# Patient Record
Sex: FEMALE | Race: WHITE | Employment: OTHER | URBAN - METROPOLITAN AREA
[De-identification: names, ages, dates, MRNs, and addresses within clinical notes are randomized per-mention and may not be internally consistent; named-entity substitution may affect disease eponyms.]

---

## 2017-01-04 ENCOUNTER — HOSPITAL ENCOUNTER (OUTPATIENT)
Dept: ULTRASOUND IMAGING | Facility: CLINIC | Age: 77
Discharge: HOME | End: 2017-01-04
Attending: PHYSICIAN ASSISTANT | Admitting: PHYSICIAN ASSISTANT
Payer: MEDICARE

## 2017-01-04 DIAGNOSIS — I85.00 ESOPHAGEAL VARICES WITHOUT BLEEDING, UNSPECIFIED ESOPHAGEAL VARICES TYPE (H): ICD-10-CM

## 2017-01-04 PROCEDURE — 93975 VASCULAR STUDY: CPT | Mod: TC

## 2017-06-12 ENCOUNTER — HOSPITAL ENCOUNTER (INPATIENT)
Facility: CLINIC | Age: 77
LOS: 1 days | Discharge: HOME | DRG: 369 | End: 2017-06-13
Attending: EMERGENCY MEDICINE | Admitting: INTERNAL MEDICINE
Payer: MEDICARE

## 2017-06-12 DIAGNOSIS — K92.0 HEMATEMESIS, PRESENCE OF NAUSEA NOT SPECIFIED: ICD-10-CM

## 2017-06-12 DIAGNOSIS — D64.9 ANEMIA, UNSPECIFIED TYPE: ICD-10-CM

## 2017-06-12 LAB
ABO + RH BLD: ABNORMAL
ABO + RH BLD: ABNORMAL
ALBUMIN SERPL-MCNC: 3.3 G/DL (ref 3.4–5)
ALP SERPL-CCNC: 68 U/L (ref 40–150)
ALT SERPL W P-5'-P-CCNC: 24 U/L (ref 0–50)
ANION GAP SERPL CALCULATED.3IONS-SCNC: 9 MMOL/L (ref 3–14)
APTT PPP: 31 SEC (ref 22–37)
AST SERPL W P-5'-P-CCNC: 22 U/L (ref 0–45)
BASOPHILS # BLD AUTO: 0 10E9/L (ref 0–0.2)
BASOPHILS NFR BLD AUTO: 0.2 %
BILIRUB SERPL-MCNC: 0.6 MG/DL (ref 0.2–1.3)
BLD GP AB INVEST PLASRBC-IMP: ABNORMAL
BLD GP AB SCN SERPL QL: ABNORMAL
BLOOD BANK CMNT PATIENT-IMP: ABNORMAL
BUN SERPL-MCNC: 27 MG/DL (ref 7–30)
CALCIUM SERPL-MCNC: 8.4 MG/DL (ref 8.5–10.1)
CHLORIDE SERPL-SCNC: 113 MMOL/L (ref 94–109)
CO2 SERPL-SCNC: 22 MMOL/L (ref 20–32)
CREAT SERPL-MCNC: 0.7 MG/DL (ref 0.52–1.04)
DIFFERENTIAL METHOD BLD: ABNORMAL
EOSINOPHIL # BLD AUTO: 0 10E9/L (ref 0–0.7)
EOSINOPHIL NFR BLD AUTO: 0.4 %
ERYTHROCYTE [DISTWIDTH] IN BLOOD BY AUTOMATED COUNT: 14 % (ref 10–15)
GFR SERPL CREATININE-BSD FRML MDRD: 81 ML/MIN/1.7M2
GLUCOSE BLDC GLUCOMTR-MCNC: 107 MG/DL (ref 70–99)
GLUCOSE SERPL-MCNC: 138 MG/DL (ref 70–99)
HCT VFR BLD AUTO: 33.3 % (ref 35–47)
HGB BLD-MCNC: 10.8 G/DL (ref 11.7–15.7)
HGB BLD-MCNC: 8.5 G/DL (ref 11.7–15.7)
HGB BLD-MCNC: 9 G/DL (ref 11.7–15.7)
IMM GRANULOCYTES # BLD: 0 10E9/L (ref 0–0.4)
IMM GRANULOCYTES NFR BLD: 0.2 %
INR PPP: 1.23 (ref 0.86–1.14)
LIPASE SERPL-CCNC: 180 U/L (ref 73–393)
LYMPHOCYTES # BLD AUTO: 0.9 10E9/L (ref 0.8–5.3)
LYMPHOCYTES NFR BLD AUTO: 15.8 %
MCH RBC QN AUTO: 30.8 PG (ref 26.5–33)
MCHC RBC AUTO-ENTMCNC: 32.4 G/DL (ref 31.5–36.5)
MCV RBC AUTO: 95 FL (ref 78–100)
MONOCYTES # BLD AUTO: 0.3 10E9/L (ref 0–1.3)
MONOCYTES NFR BLD AUTO: 5.5 %
NEUTROPHILS # BLD AUTO: 4.4 10E9/L (ref 1.6–8.3)
NEUTROPHILS NFR BLD AUTO: 77.9 %
PLATELET # BLD AUTO: 100 10E9/L (ref 150–450)
POTASSIUM SERPL-SCNC: 4.2 MMOL/L (ref 3.4–5.3)
PROT SERPL-MCNC: 6.1 G/DL (ref 6.8–8.8)
RBC # BLD AUTO: 3.51 10E12/L (ref 3.8–5.2)
SODIUM SERPL-SCNC: 144 MMOL/L (ref 133–144)
SPECIMEN EXP DATE BLD: ABNORMAL
WBC # BLD AUTO: 5.6 10E9/L (ref 4–11)

## 2017-06-12 PROCEDURE — 86870 RBC ANTIBODY IDENTIFICATION: CPT | Performed by: EMERGENCY MEDICINE

## 2017-06-12 PROCEDURE — 86901 BLOOD TYPING SEROLOGIC RH(D): CPT | Performed by: EMERGENCY MEDICINE

## 2017-06-12 PROCEDURE — 85025 COMPLETE CBC W/AUTO DIFF WBC: CPT | Performed by: EMERGENCY MEDICINE

## 2017-06-12 PROCEDURE — 96374 THER/PROPH/DIAG INJ IV PUSH: CPT

## 2017-06-12 PROCEDURE — 80053 COMPREHEN METABOLIC PANEL: CPT | Performed by: EMERGENCY MEDICINE

## 2017-06-12 PROCEDURE — 83690 ASSAY OF LIPASE: CPT | Performed by: EMERGENCY MEDICINE

## 2017-06-12 PROCEDURE — 25000125 ZZHC RX 250: Performed by: INTERNAL MEDICINE

## 2017-06-12 PROCEDURE — 99285 EMERGENCY DEPT VISIT HI MDM: CPT

## 2017-06-12 PROCEDURE — 25000128 H RX IP 250 OP 636: Performed by: INTERNAL MEDICINE

## 2017-06-12 PROCEDURE — 96361 HYDRATE IV INFUSION ADD-ON: CPT

## 2017-06-12 PROCEDURE — 40000141 ZZH STATISTIC PERIPHERAL IV START W/O US GUIDANCE

## 2017-06-12 PROCEDURE — 85018 HEMOGLOBIN: CPT | Performed by: INTERNAL MEDICINE

## 2017-06-12 PROCEDURE — 99223 1ST HOSP IP/OBS HIGH 75: CPT | Mod: AI | Performed by: INTERNAL MEDICINE

## 2017-06-12 PROCEDURE — 96375 TX/PRO/DX INJ NEW DRUG ADDON: CPT

## 2017-06-12 PROCEDURE — 93005 ELECTROCARDIOGRAM TRACING: CPT

## 2017-06-12 PROCEDURE — 12000000 ZZH R&B MED SURG/OB

## 2017-06-12 PROCEDURE — 93010 ELECTROCARDIOGRAM REPORT: CPT | Performed by: INTERNAL MEDICINE

## 2017-06-12 PROCEDURE — 25000128 H RX IP 250 OP 636: Performed by: HOSPITALIST

## 2017-06-12 PROCEDURE — 25000131 ZZH RX MED GY IP 250 OP 636 PS 637: Mod: GY | Performed by: EMERGENCY MEDICINE

## 2017-06-12 PROCEDURE — 43235 EGD DIAGNOSTIC BRUSH WASH: CPT | Performed by: INTERNAL MEDICINE

## 2017-06-12 PROCEDURE — 86900 BLOOD TYPING SEROLOGIC ABO: CPT | Performed by: EMERGENCY MEDICINE

## 2017-06-12 PROCEDURE — 25000125 ZZHC RX 250

## 2017-06-12 PROCEDURE — 0DJ08ZZ INSPECTION OF UPPER INTESTINAL TRACT, VIA NATURAL OR ARTIFICIAL OPENING ENDOSCOPIC: ICD-10-PCS | Performed by: INTERNAL MEDICINE

## 2017-06-12 PROCEDURE — 85610 PROTHROMBIN TIME: CPT | Performed by: EMERGENCY MEDICINE

## 2017-06-12 PROCEDURE — 86850 RBC ANTIBODY SCREEN: CPT | Performed by: EMERGENCY MEDICINE

## 2017-06-12 PROCEDURE — 00000146 ZZHCL STATISTIC GLUCOSE BY METER IP

## 2017-06-12 PROCEDURE — 36415 COLL VENOUS BLD VENIPUNCTURE: CPT | Performed by: INTERNAL MEDICINE

## 2017-06-12 PROCEDURE — 25000128 H RX IP 250 OP 636: Performed by: EMERGENCY MEDICINE

## 2017-06-12 PROCEDURE — 25000125 ZZHC RX 250: Performed by: EMERGENCY MEDICINE

## 2017-06-12 PROCEDURE — S0164 INJECTION PANTROPRAZOLE: HCPCS | Performed by: INTERNAL MEDICINE

## 2017-06-12 PROCEDURE — S0164 INJECTION PANTROPRAZOLE: HCPCS | Performed by: EMERGENCY MEDICINE

## 2017-06-12 PROCEDURE — 25000128 H RX IP 250 OP 636

## 2017-06-12 PROCEDURE — 85730 THROMBOPLASTIN TIME PARTIAL: CPT | Performed by: EMERGENCY MEDICINE

## 2017-06-12 RX ORDER — NALOXONE HYDROCHLORIDE 0.4 MG/ML
.1-.4 INJECTION, SOLUTION INTRAMUSCULAR; INTRAVENOUS; SUBCUTANEOUS
Status: DISCONTINUED | OUTPATIENT
Start: 2017-06-12 | End: 2017-06-12

## 2017-06-12 RX ORDER — LIDOCAINE 40 MG/G
CREAM TOPICAL
Status: CANCELLED | OUTPATIENT
Start: 2017-06-12

## 2017-06-12 RX ORDER — OCTREOTIDE ACETATE 50 UG/ML
50 INJECTION, SOLUTION INTRAVENOUS; SUBCUTANEOUS ONCE
Status: COMPLETED | OUTPATIENT
Start: 2017-06-12 | End: 2017-06-12

## 2017-06-12 RX ORDER — AMLODIPINE BESYLATE 2.5 MG/1
5 TABLET ORAL DAILY
Status: DISCONTINUED | OUTPATIENT
Start: 2017-06-13 | End: 2017-06-13

## 2017-06-12 RX ORDER — FENTANYL CITRATE 50 UG/ML
100 INJECTION, SOLUTION INTRAMUSCULAR; INTRAVENOUS ONCE
Status: DISCONTINUED | OUTPATIENT
Start: 2017-06-12 | End: 2017-06-12

## 2017-06-12 RX ORDER — SODIUM CHLORIDE 9 MG/ML
INJECTION, SOLUTION INTRAVENOUS CONTINUOUS
Status: DISCONTINUED | OUTPATIENT
Start: 2017-06-12 | End: 2017-06-12 | Stop reason: DRUGHIGH

## 2017-06-12 RX ORDER — LIDOCAINE 40 MG/G
CREAM TOPICAL
Status: DISCONTINUED | OUTPATIENT
Start: 2017-06-12 | End: 2017-06-13 | Stop reason: HOSPADM

## 2017-06-12 RX ORDER — FLUMAZENIL 0.1 MG/ML
0.2 INJECTION, SOLUTION INTRAVENOUS
Status: DISCONTINUED | OUTPATIENT
Start: 2017-06-12 | End: 2017-06-12

## 2017-06-12 RX ORDER — FENTANYL CITRATE 50 UG/ML
25 INJECTION, SOLUTION INTRAMUSCULAR; INTRAVENOUS EVERY 5 MIN PRN
Status: DISCONTINUED | OUTPATIENT
Start: 2017-06-12 | End: 2017-06-12

## 2017-06-12 RX ORDER — FENTANYL CITRATE 50 UG/ML
25-50 INJECTION, SOLUTION INTRAMUSCULAR; INTRAVENOUS
Status: DISCONTINUED | OUTPATIENT
Start: 2017-06-12 | End: 2017-06-12

## 2017-06-12 RX ORDER — CEFTRIAXONE 1 G/1
1 INJECTION, POWDER, FOR SOLUTION INTRAMUSCULAR; INTRAVENOUS EVERY 24 HOURS
Status: DISCONTINUED | OUTPATIENT
Start: 2017-06-12 | End: 2017-06-13

## 2017-06-12 RX ORDER — NALOXONE HYDROCHLORIDE 0.4 MG/ML
.1-.4 INJECTION, SOLUTION INTRAMUSCULAR; INTRAVENOUS; SUBCUTANEOUS
Status: DISCONTINUED | OUTPATIENT
Start: 2017-06-12 | End: 2017-06-13 | Stop reason: HOSPADM

## 2017-06-12 RX ORDER — SODIUM CHLORIDE 9 MG/ML
1000 INJECTION, SOLUTION INTRAVENOUS CONTINUOUS
Status: DISCONTINUED | OUTPATIENT
Start: 2017-06-12 | End: 2017-06-13

## 2017-06-12 RX ORDER — FENTANYL CITRATE 50 UG/ML
INJECTION, SOLUTION INTRAMUSCULAR; INTRAVENOUS
Status: COMPLETED
Start: 2017-06-12 | End: 2017-06-12

## 2017-06-12 RX ORDER — ONDANSETRON 4 MG/1
4 TABLET, ORALLY DISINTEGRATING ORAL EVERY 6 HOURS PRN
Status: DISCONTINUED | OUTPATIENT
Start: 2017-06-12 | End: 2017-06-13 | Stop reason: HOSPADM

## 2017-06-12 RX ORDER — LISINOPRIL 2.5 MG/1
2.5 TABLET ORAL DAILY
Status: DISCONTINUED | OUTPATIENT
Start: 2017-06-13 | End: 2017-06-13 | Stop reason: HOSPADM

## 2017-06-12 RX ORDER — ONDANSETRON 2 MG/ML
4 INJECTION INTRAMUSCULAR; INTRAVENOUS EVERY 6 HOURS PRN
Status: DISCONTINUED | OUTPATIENT
Start: 2017-06-12 | End: 2017-06-13 | Stop reason: HOSPADM

## 2017-06-12 RX ADMIN — FENTANYL CITRATE 100 MCG: 50 INJECTION, SOLUTION INTRAMUSCULAR; INTRAVENOUS at 13:10

## 2017-06-12 RX ADMIN — SODIUM CHLORIDE 1000 ML: 9 INJECTION, SOLUTION INTRAVENOUS at 07:25

## 2017-06-12 RX ADMIN — SODIUM CHLORIDE 1000 ML: 9 INJECTION, SOLUTION INTRAVENOUS at 23:25

## 2017-06-12 RX ADMIN — SODIUM CHLORIDE 1000 ML: 9 INJECTION, SOLUTION INTRAVENOUS at 05:00

## 2017-06-12 RX ADMIN — PANTOPRAZOLE SODIUM 80 MG: 40 INJECTION, POWDER, FOR SOLUTION INTRAVENOUS at 05:25

## 2017-06-12 RX ADMIN — PANTOPRAZOLE SODIUM 8 MG/HR: 40 INJECTION, POWDER, FOR SOLUTION INTRAVENOUS at 05:24

## 2017-06-12 RX ADMIN — OCTREOTIDE ACETATE 50 MCG/HR: 200 INJECTION, SOLUTION INTRAVENOUS; SUBCUTANEOUS at 05:25

## 2017-06-12 RX ADMIN — MIDAZOLAM HYDROCHLORIDE 2 MG: 1 INJECTION, SOLUTION INTRAMUSCULAR; INTRAVENOUS at 13:08

## 2017-06-12 RX ADMIN — OCTREOTIDE ACETATE 50 MCG: 50 INJECTION, SOLUTION INTRAVENOUS; SUBCUTANEOUS at 05:24

## 2017-06-12 RX ADMIN — PANTOPRAZOLE SODIUM 8 MG/HR: 40 INJECTION, POWDER, FOR SOLUTION INTRAVENOUS at 17:59

## 2017-06-12 RX ADMIN — CEFTRIAXONE 1 G: 1 INJECTION, POWDER, FOR SOLUTION INTRAMUSCULAR; INTRAVENOUS at 10:32

## 2017-06-12 ASSESSMENT — ENCOUNTER SYMPTOMS
VOMITING: 1
NAUSEA: 0
ABDOMINAL PAIN: 0
ROS GI COMMENTS: BLACK STOOLS
FEVER: 0

## 2017-06-12 NOTE — ED NOTES
[facility]  ED Nurse Handoff Report    ED Chief complaint: Hematemesis (x2; had similar episode last October)      ED Diagnosis:   Final diagnoses:   Hematemesis, presence of nausea not specified   Anemia, unspecified type       Code Status: Full Code    Allergies:   Allergies   Allergen Reactions     Codeine        Activity level - Baseline/Home:  Independent    Activity Level - Current:   Independent     Needed?: No    Isolation: No  Infection: Not Applicable    Bariatric?: No    Vital Signs:   Vitals:    06/12/17 0530 06/12/17 0545 06/12/17 0600 06/12/17 0605   BP: 124/55 113/59 91/49 124/68   Pulse:       Resp: 20 16 16 10   Temp:       TempSrc:       SpO2: 97% 98% 94% 98%   Weight:       Height:           Cardiac Rhythm: ,   Cardiac  Cardiac Rhythm: Normal sinus rhythm    Pain level: 0-10 Pain Scale: 0    Is this patient confused?: No    Patient Report: Initial Complaint: The Pt presents to the ED with c/o hematemesis x 2 over the last 24 hours. The Pt reports that she is pain free and is not nauseated. She also indicates that she has had previous hematemesis and has required an upper endoscopy. She states that she has been using iron pills and her stool is dark. She reports this may be from iron supplements.  Focused Assessment: The Pt has done well in the ED. She denies pain.  Tests Performed: lab work  Abnormal Results: see flowsheet  Treatments provided: medications and fluids    Family Comments:  at bedside.    OBS brochure/video discussed/provided to patient: N/A    ED Medications:   Medications   lidocaine 1 % 1 mL (not administered)   lidocaine (LMX4) cream (not administered)   sodium chloride (PF) 0.9% PF flush 3 mL (not administered)   sodium chloride (PF) 0.9% PF flush 3 mL (not administered)   0.9% sodium chloride BOLUS (0 mLs Intravenous Stopped 6/12/17 0638)     Followed by   0.9% sodium chloride infusion (not administered)   pantoprazole (PROTONIX) 80 mg in NaCl  0.9 % 100 mL infusion (8 mg/hr Intravenous New Bag 6/12/17 0524)   octreotide (sandoSTATIN) 1,250 mcg in NaCl 0.9 % 250 mL (50 mcg/hr Intravenous New Bag 6/12/17 0525)   pantoprazole (PROTONIX) 80 mg in NaCl 0.9 % 100 mL intermittent infusion (80 mg Intravenous New Bag 6/12/17 0525)   octreotide (sandoSTATIN) injection 50 mcg (50 mcg Intravenous Given 6/12/17 0524)       Drips infusing?:  Yes      ED NURSE PHONE NUMBER: [phone]

## 2017-06-12 NOTE — ED PROVIDER NOTES
"  History     Chief Complaint:  Hematemesis     HPI   Catina Barrow is a 76 year old female with history of esophageal varices who presents to the emergency department today for evaluation of hematemesis. The patient reports hematemesis twice yesterday, once in the morning and once at night. She had a similar episode in October 2016 secondary to esophageal varices. She reports black stool, however has been taking more iron supplements since she is anemic. The patient is afebrile and denies nausea and other concerns at this time.     Allergies:  Codeine     Medications:    pantoprazole (PROTONIX) 40 MG enteric coated tablet  ferrous sulfate (IRON) 325 (65 FE) MG tablet  ascorbic acid (VITAMIN C) 500 MG tablet  SERTRALINE HCL PO    Past Medical History:    Esophageal varices   Anemia, unspecified   Diastolic dysfunction   Hemorrhagic stroke    Hypertension   Recurrent major depression in remission     Past Surgical History:    Cholecystectomy  Hysterectomy   Keratomy arcuate with femtosecond laser/imaging for atiol x2  Phacoemulsification clear cornea with standard intraocular lens implant x2    Family History:    Hypertension    Social History:  The patient was accompanied to the ED by her .  Smoking Status: Never smoker   Alcohol Use: No   Marital Status:   [2]     Review of Systems   Constitutional: Negative for fever.   Gastrointestinal: Positive for vomiting (hematemesis). Negative for abdominal pain and nausea.        Black stools   All other systems reviewed and are negative.    Physical Exam   First Vitals:  BP: 102/55  Pulse: 86  Temp: 97.9  F (36.6  C)  Resp: 14  Height: 170.2 cm (5' 7\")  Weight: 61.2 kg (135 lb)  SpO2: 92 %      Physical Exam   Constitutional:  Appears well-developed and well-nourished. Cooperative.   Head:    Dried red blood in hair and on cheek   Mouth/Throat:   Oropharynx is without erythema or exudate. Dry oral mucosa.  Eyes:    Conjunctivae normal and EOM are normal. "      Pupils are equal, round, and reactive to light.   Neck:    Normal range of motion. Neck supple.   Cardiovascular:  Normal rate, regular rhythm, normal heart sounds and radial and dorsalis pedis pulses are 2+ and symmetric.    Pulmonary/Chest:  Effort normal and breath sounds normal.   Abdominal:   Soft. Bowel sounds are normal.      No splenomegaly or hepatomegaly. No tenderness. No rebound.   Musculoskeletal:  Normal range of motion. No tenderness. Trace edema in both legs.   Neurological:  Alert. Normal strength. No cranial nerve deficit.   Skin:    Skin is pale, warm, and dry.   Psychiatric:   Normal mood and affect.     Emergency Department Course     Laboratory:  Laboratory findings were communicated with the patient and family who voiced understanding of the findings.  CBC: HGB 10.8 (L) ,  (L)  o/w WNL. (WBC 5.6)   CMP:  Chloride 113 (H), Glucose 138 (H), Calcium 8.4 (L), Albumin 3.3 (L), Protein total 6.1 (L), o/w WNL (Creatinine 0.70)  INR: 1.23 (H)  Partial Thromboplastin time: 31  Lipase: 180    ABO/Rh Type and Screen: Pending     Interventions:  0500 NS 1,000mL IV  0524 Protonix 8mg/hr IV  0524 Sandostatin 50mcg IV   0525 Sandostatin 50mcg/hr IV new bag   0525 Protonix 8mg/hr IV new bag      Emergency Department Course:  Nursing notes and vitals reviewed.  I performed an exam of the patient as documented above.   IV was inserted and blood was drawn for laboratory testing, results above.  0605: I spoke with Dr. Monahan of the hospitalist service regarding patient's presentation, findings, and plan of care.  0620 I spoke with Dr. Mario of the gastroenterology service regarding patient's presentation, findings, and plan of care.   I discussed the treatment plan with the patient. They expressed understanding of this plan and consented to admission. I discussed the patient with Dr. Monahan, who will admit the patient to a monitored bed for further evaluation and treatment.  I personally reviewed the  laboratory results with the Patient and answered all related questions prior to admission.     Impression & Plan      Medical Decision Making:  Catina Barrow is a 76 year old female who presents with hematemesis.  This is consistent with an upper gastrointestinal bleed likely secondary to esophageal varices. Differential considered includes ulcer, gastritis, avm, tumor, esophagitis, variceal bleed, miriam-jovel tear, ingestion, etc.  Patient was initially hypotensive at 102/55, however that improved to 124/68 throughout her course in the ED. No history of chronic liver disease and INR is normal. Protonix given. I started the patient on octreotide as her presentation is similar to her previous episodes of hematemesis due to varices. Given amount of bleeding and presentation, I would hospitalize patient at this time. GI was consulted.  Admit to ICU under care of the hospitalist for further cares.  Type and cross completed. Blood was not transfused in the ED.  Watched closely in ED for further drop in BP or HR elevation.      Diagnosis:    ICD-10-CM    1. Hematemesis, presence of nausea not specified K92.0    2. Anemia, unspecified type D64.9      Disposition:   Admitted to ICU under the supervision of Dr. Monahan.     Scribe Disclosure:  Lorenza CARTER, am serving as a scribe at 4:43 AM on 6/12/2017 to document services personally performed by Pola Pompa MD, based on my observations and the provider's statements to me.    6/12/2017    EMERGENCY DEPARTMENT       Pola Pompa MD  06/12/17 0825

## 2017-06-12 NOTE — IP AVS SNAPSHOT
[facility]    [address] [place] [address]    Phone:  [phone]                                       After Visit Summary   6/12/2017    Catina Barrow    MRN: [id number]           After Visit Summary Signature Page     I have received my discharge instructions, and my questions have been answered. I have discussed any challenges I see with this plan with the nurse or doctor.    ..........................................................................................................................................  Patient/Patient Representative Signature      ..........................................................................................................................................  Patient Representative Print Name and Relationship to Patient    ..................................................               ................................................  Date                                            Time    ..........................................................................................................................................  Reviewed by Signature/Title    ...................................................              ..............................................  Date                                                            Time

## 2017-06-12 NOTE — DOWNTIME EVENT NOTE
The EMR was down for 5 hours on 6/12/2017.  RN was responsible for completing the paper charting during this time period.     The following information was re-entered into the system by Gabe Bright: Flowsheet data, Intake and output, Orders and MAR    The following information will remain in the paper chart: Assessment Data    Gabe Bright  6/12/2017

## 2017-06-12 NOTE — H&P
DATE OF ADMISSION:  06/12/2017      PRIMARY CARE PHYSICIAN:  Dr. Liang Glover.      PRIMARY GASTROENTEROLOGIST:  Dr. Kelly Traylor.      CHIEF COMPLAINT:  Hematemesis x3.      HISTORY OF PRESENT ILLNESS:  Catina Barrow is a 76-year-old  male with history of known esophageal varices.  Unfortunately, the patient had some problems with her dentures and she had some denture pain.  She ended up taking 2 Advil daily for about 4 days, ending on Saturday.  The patient last night had 1 episode of hematemesis in the evening and then around 3:00 a.m. she had her second episode of hematemesis.  She came to [facility] for further assessment.      In the emergency, the patient seen by Dr. Pola Pompa.  The patient's blood pressures were on the softer side, initially at 91/49; however, she was not tachycardic.  She was afebrile.  Blood work revealed normal electrolytes, normal kidney function, BUN was 27, creatinine 0.70 with calculated GFR 81.  LFTs significant for albumin of 3.3.  She does have cirrhosis.  Total protein 6.1.  Otherwise other LFTs were normal.  Lipase is normal.  CBC showed a white count 5.3, hemoglobin 10.8, platelets of 100.  INR is 1.23.  The patient was started on octreotide drip and Protonix drip.  She is being admitted to the Intensive Care Unit for suspected esophageal variceal bleed.      The patient was examined by me in the Emergency Department.  She is currently hemodynamically stable.  She is not having any chest pain or shortness of breath.  She is not feeling orthostatic.  Blood pressures have improved above 100.  She continues not to be tachycardic.      PAST MEDICAL HISTORY:   1.  Esophageal varices.   2.  Diastolic dysfunction.   3.  History of hemorrhagic stroke.   4.  Hypertension.   5.  Major recurrent depression in remission.   6.  Cirrhosis.   7.  Iron deficiency anemia.      PAST SURGICAL HISTORY:     1.  Cholecystectomy.   2.  Hysterectomy.   3.   Phacoemulsification and lens implantation.      FAMILY HISTORY:  Hypertension.      SOCIAL HISTORY:  No tobacco, occasional wine.  Denies any IV drug use.      ALLERGIES:  Codeine.      CURRENT MEDICATIONS:   1.  Vitamin C 500 mg once daily.   2.  Iron 325 mg twice a day.   3.  Protonix 40 mg once a day.   4.  Systane ultra ophthalmic solution 1 drop both eyes every hour as needed.     5.  Sertraline 100 mg once a day.      REVIEW OF SYSTEMS:  Ten point is reviewed and as dictated in history of present illness.      PHYSICAL EXAMINATION:   VITAL SIGNS:  Temperature 97.9, heart rate 86, respirations 10, blood pressure 124/68, sats 98% on room air.   GENERAL:  The patient is a 76-year-old female.  She is alert and oriented x3, no distress.   HEENT:  Unremarkable.  She does have dried blood on the left side of her hair.  Pupils equal.  Sclerae anicteric.  Mucous membranes are moist.   NECK:  Veins not distended.   LUNGS:  Clear to auscultation.   CARDIOVASCULAR:  S1, S2, regular rate and rhythm.   ABDOMEN:  Slightly obese.  No focal tenderness, no guarding or rebound.  Bowel sounds are hyperactive.   EXTREMITIES:  No clubbing, cyanosis or edema.   NEUROLOGIC:  The patient is grossly nonfocal.  Cranial nerves grossly intact.      LABORATORY DATA:  As dictated in history of present illness.      ASSESSMENT:  Catnia Barrow is a 76-year-old female with suspected esophageal variceal bleeding with recent exposure to NSAIDs for about 4 days, being admitted to the Intensive Care Unit as an inpatient for likely urgent upper endoscopy.      PLAN:   1.  Hematemesis, suspected secondary to esophageal variceal bleeding.  The patient has been started on octreotide drip as well as Protonix drip.  The patient will be given normal saline 100 mL an hour.  We will do hemoglobins every 6 hours.  She has been typed and crossed.  We put in a consultation for GI.  The patient will likely undergo endoscopy up in the Intensive Care Unit.   Currently, she is hemodynamically stable and improving.   2.  History of hypertension.  She was previously on lisinopril and amlodipine.  I do not have her current medication list, but we will obviously hold her blood pressure medications.   3.  Depression.  We will hold her sertraline as she is n.p.o.   4.  History of hemorrhagic stroke.  She is not on any blood thinner medications as a result of this.   5.  Deep venous thrombosis prophylaxis with compression boots.      CODE STATUS:  Full.         QUETA KENNEDY MD             D: 2017 06:19   T: 2017 06:42   MT: SK      Name:     ALEKSANDR WESTON   MRN:      [id number]        Account:      [id number]   :      1940           Admitted:     [id number]      Document: [id number]       cc: Liang Traylor MD

## 2017-06-12 NOTE — CONSULTS
GASTROENTEROLOGY CONSULTATION      Catina Barrow  [address] [facility] [address]  76 year old female     Admission Date/Time: 6/12/2017  Primary Care Provider: Liang Glover  Referring / Attending Physician:  Taniya     We were asked to see the patient in consultation by Dr. Monahan for evaluation of hematemesis.    ASSESSMENT:    UGIB  Decompensated cirrhosis, etiology uncertain    RECOMMENDATIONS:  NPO for EGD today  Add ceftriaxone for presumed variceal bleeding  Follow MELD labs    Frank Lopez DO   [facility]  Cell [phone]  ________________________________________________________________________        CC: hematemesis     HPI:  Catina Barrow is a 76 year old female who presented with hematemesis.  Known to our service from previous consultation & EGD 10/2016 for hematemesis - large esophageal varices banded at that time (Gilmer erosions and bleeding Dieulafoy in the stomach).  Liver US demonstrated patent outflow with liver span 17.2cm, and splenomegaly.  Liver clinic labs from 12/2016 demonstrated strongly positive RADHA and actin with normal Igs and transaminases.  Due to her ISAI and varices, EGD and colonoscopy were recommended, but the patient deferred.  Does not drink a significant amount of etoh - maybe 1 drink a month.     Presented this morning with 2 episodes of large, red emesis.  Has recently taken advil for pain related to dentures - denies oral or nasal bleeding.   Was initially hypotensive in the ED, now vitals appear stable, on octreotide, and PPI.   States she is without pain or nausea.  Was having dark black stools due to po iron, but states her stools have been brown last 24 hrs.     ROS: A comprehensive ten point review of systems was negative aside from those in mentioned in the HPI.      PAST MEDICAL HISTORY:  Patient Active Problem List    Diagnosis Date Noted     Esophageal varices with bleeding in diseases classified elsewhere (H) 06/12/2017      "Priority: Medium     Hematemesis without nausea 10/07/2016     Priority: Medium     Iron deficiency anemia due to chronic blood loss 10/07/2016     Priority: Medium     Hematemesis 10/07/2016     Priority: Medium     Vasovagal syncope 03/11/2016     Priority: Medium     Orthostasis 03/11/2016     Priority: Medium     Dehydration 03/11/2016     Priority: Medium     Diastolic dysfunction 03/29/2016     Echo 3/11/16:    EF 60-65%  Grade II diastolic dysfunction       SOCIAL HISTORY:  Social History   Substance Use Topics     Smoking status: Never Smoker     Smokeless tobacco: Not on file     Alcohol use No     FAMILY HISTORY:  Family History   Problem Relation Age of Onset     Hypertension Father      ALLERGIES:   Allergies   Allergen Reactions     Codeine      MEDICATIONS:   Current Facility-Administered Medications   Medication     lidocaine 1 % 1 mL     lidocaine (LMX4) cream     sodium chloride (PF) 0.9% PF flush 3 mL     sodium chloride (PF) 0.9% PF flush 3 mL     0.9% sodium chloride infusion     naloxone (NARCAN) injection 0.1-0.4 mg     ondansetron (ZOFRAN-ODT) ODT tab 4 mg    Or     ondansetron (ZOFRAN) injection 4 mg     0.9% sodium chloride infusion     octreotide (sandoSTATIN) 1,250 mcg in NaCl 0.9 % 250 mL     pantoprazole (PROTONIX) 80 mg in NaCl 0.9 % 100 mL infusion     flumazenil (ROMAZICON) injection 0.2 mg     midazolam (VERSED) injection 0.5-1 mg    Followed by     midazolam (VERSED) injection 0.5 mg     fentaNYL Citrate (PF) (SUBLIMAZE) injection 25-50 mcg    Followed by     fentaNYL Citrate (PF) (SUBLIMAZE) injection 25 mcg     PHYSICAL EXAM:   /64  Pulse 85  Temp 98.9  F (37.2  C) (Oral)  Resp 14  Ht 1.702 m (5' 7\")  Wt 61.2 kg (135 lb)  SpO2 98%  BMI 21.14 kg/m2   GEN: Alert, oriented x3, communicative and in NAD.  HENRY: AT, anicteric, edentulous, OP without erythema, exudate, or ulcers.    NECK: Supple.    LYMPH: No LAD noted.  HRT: RRR  LUNGS: CTA  ABD: ND, +BS, no guarding or pain " to palpation, no rebound, no HSM.  SKIN: No rash, jaundice or spider angiomata  MSKL: LE free of edema, strength 5/5 all 4 extrems  NEURO: CN grossly intact, sensation intact to light touch, toes downgoing.    ADDITIONAL DATA:   I reviewed the patient's new clinical lab test results.   Recent Labs   Lab Test  06/12/17   0454  10/09/16   0854  10/08/16   1805   10/07/16   1040 03/23/16   WBC  5.6   --    --    --   5.2  4.0   HGB  10.8*  8.0*  7.7*   < >  8.2*  9.8*   MCV  95   --    --    --   92  75   PLT  100*   --    --    --   119*  173   INR  1.23*   --    --    --   1.28*   --     < > = values in this interval not displayed.     Recent Labs   Lab Test  06/12/17   0454  10/07/16   1040 03/12/16 03/11/16   1208   POTASSIUM  4.2  4.0  4.4  3.8   CHLORIDE  113*  111*  103  111*   CO2  22  24   --   22   BUN  27  30  17  17   ANIONGAP  9  7   --   8     Recent Labs   Lab Test  06/12/17 0454  10/08/16   1040  10/07/16   1040   ALBUMIN  3.3*   --   3.2*   BILITOTAL  0.6   --   0.5   ALT  24   --   24   AST  22   --   19   PROTEIN   --   Negative   --    LIPASE  180   --   181        I reviewed the patient's new imaging results.

## 2017-06-12 NOTE — IP AVS SNAPSHOT
MRN:[id number]                      After Visit Summary   6/12/2017    Catina Barrow    MRN: [id number]           Thank you!     Thank you for choosing [facility] for your care. Our goal is always to provide you with excellent care. Hearing back from our patients is one way we can continue to improve our services. Please take a few minutes to complete the written survey that you may receive in the mail after you visit with us. Thank you!        Patient Information     Date Of Birth          1940        About your hospital stay     You were admitted on:  June 12, 2017 You last received care in the:  [facility] [address] [facility]    You were discharged on:  June 13, 2017        Reason for your hospital stay       Bleeding from upper GI tract                  Who to Call     For medical emergencies, please call 911.  For non-urgent questions about your medical care, please call your primary care provider or clinic, [phone]  For questions related to your surgery, please call your surgery clinic        Attending Provider     Provider Specialty    Pola Pompa MD Emergency Medicine    [facility], Joshua Miranda MD Internal Medicine       Primary Care Provider Office Phone # Fax #    Liang Glover [phone]       When to contact your care team       Call your primary doctor if you have any of the following: vomiting blood, dizziness, upper abdominal pain, bloating, and black stools.                  After Care Instructions     Activity       Your activity upon discharge: activity as tolerated            Diet       Follow this diet upon discharge: Orders Placed This Encounter      Advance Diet as Tolerated: Regular Diet Adult            Discharge Instructions       DO NOT take medications like ibuprofen/naproxen/motrin/alleve (NSAIDs) etc                  Follow-up Appointments     Follow-up and recommended labs and tests        Follow up with primary care  "provider, Liang Glover, within 7 days to evaluate medication change and for hospital follow- up.  The following labs/tests are recommended: H&H in 3-4 days.    Follow up with GI doctor to have repeat Endoscopy in a month, please call for appointment.                  Pending Results     Date and Time Order Name Status Description    2017 1736 EKG 12-lead, tracing only Preliminary             Statement of Approval     Ordered          17 0896  I have reviewed and agree with all the recommendations and orders detailed in this document.  EFFECTIVE NOW     Approved and electronically signed by:  Estela Lozano MD             Admission Information     Date & Time Provider Department Dept. Phone    2017 Joshua Monahan MD [place] [address] [facility] [phone]      Your Vitals Were     Blood Pressure Pulse Temperature Respirations Height Weight    110/56 (BP Location: Left arm) 78 98.7  F (37.1  C) (Oral) 18 1.702 m (5' 7\") 70.8 kg (156 lb)    Pulse Oximetry BMI (Body Mass Index)                93% 24.43 kg/m2          [employer] Information     [employer] lets you send messages to your doctor, view your test results, renew your prescriptions, schedule appointments and more. To sign up, go to [email or web address] . Click on \"Log in\" on the left side of the screen, which will take you to the Welcome page. Then click on \"Sign up Now\" on the right side of the page.     You will be asked to enter the access code listed below, as well as some personal information. Please follow the directions to create your username and password.     Your access code is: [email or web address]  Expires: 2017  4:03 PM     Your access code will  in 90 days. If you need help or a new code, please call your [place] clinic or [phone].        Care EveryWhere ID     This is your Care EveryWhere ID. This could be used by other organizations to access your [facility] medical records  [id number]         "   Review of your medicines      CONTINUE these medicines which have NOT CHANGED        Dose / Directions    ascorbic acid 500 MG tablet   Commonly known as:  VITAMIN C   Used for:  Iron deficiency anemia, unspecified iron deficiency        Dose:  500 mg   Take 1 tablet (500 mg) by mouth daily   Quantity:  30 tablet   Refills:  0       ferrous sulfate 325 (65 FE) MG tablet   Commonly known as:  IRON   Used for:  Iron deficiency anemia, unspecified iron deficiency        Dose:  325 mg   Take 1 tablet (325 mg) by mouth 2 times daily (with meals)   Quantity:  100 tablet   Refills:  0       LISINOPRIL PO        Dose:  2.5 mg   Take 2.5 mg by mouth daily   Refills:  0       NORVASC PO        Dose:  5 mg   Take 5 mg by mouth daily   Refills:  0       pantoprazole 40 MG EC tablet   Commonly known as:  PROTONIX   Used for:  Hematemesis, presence of nausea not specified        Dose:  40 mg   Take 1 tablet (40 mg) by mouth daily   Quantity:  30 tablet   Refills:  0       SERTRALINE HCL PO        Dose:  100 mg   Take 100 mg by mouth daily   Refills:  0                Protect others around you: Learn how to safely use, store and throw away your medicines at www.disposemymeds.org.             Medication List: This is a list of all your medications and when to take them. Check marks below indicate your daily home schedule. Keep this list as a reference.      Medications           Morning Afternoon Evening Bedtime As Needed    ascorbic acid 500 MG tablet   Commonly known as:  VITAMIN C   Take 1 tablet (500 mg) by mouth daily                                ferrous sulfate 325 (65 FE) MG tablet   Commonly known as:  IRON   Take 1 tablet (325 mg) by mouth 2 times daily (with meals)                                LISINOPRIL PO   Take 2.5 mg by mouth daily   Last time this was given:  2.5 mg on 6/13/2017  8:24 AM                                NORVASC PO   Take 5 mg by mouth daily                                pantoprazole 40 MG EC  tablet   Commonly known as:  PROTONIX   Take 1 tablet (40 mg) by mouth daily   Last time this was given:  40 mg on 6/13/2017  8:24 AM                                SERTRALINE HCL PO   Take 100 mg by mouth daily

## 2017-06-12 NOTE — PHARMACY-ADMISSION MEDICATION HISTORY
Admission medication history interview status for the 6/12/2017  admission is complete. See EPIC admission navigator for prior to admission medications     Medication history source reliability:Moderate    Actions taken by pharmacist (provider contacted, etc): Will call [facility] to get dose of Lisinopril     Additional medication history information not noted on PTA med list :None    Medication reconciliation/reorder completed by provider prior to medication history? Yes    Time spent in this activity: 10    Prior to Admission medications    Medication Sig Last Dose Taking? Auth Provider   AmLODIPine Besylate (NORVASC PO) Take 5 mg by mouth daily 6/11/2017 at Unknown time Yes Unknown, Entered By History   LISINOPRIL PO Take by mouth daily 6/10/2017 Yes Unknown, Entered By History   pantoprazole (PROTONIX) 40 MG enteric coated tablet Take 1 tablet (40 mg) by mouth daily 6/10/2017 Yes Nga Hamm MD   ferrous sulfate (IRON) 325 (65 FE) MG tablet Take 1 tablet (325 mg) by mouth 2 times daily (with meals) 6/11/2017 at am Yes Gina Amador PA-C   ascorbic acid (VITAMIN C) 500 MG tablet Take 1 tablet (500 mg) by mouth daily 6/11/2017 at Unknown time Yes Gina Amador PA-C   SERTRALINE HCL PO Take 100 mg by mouth daily  6/11/2017 at Unknown time Yes Reported, Patient

## 2017-06-12 NOTE — PROGRESS NOTES
ICU Multi-Disciplinary Note  Patient condition reviewed and discussed while on multidisciplinary rounds today.     The Critical Care service will continue to follow peripherally while patient is within the ICU. We are readily available should issues arise.  Please feel free to contact us for anything with which we may be of assistance.    Alisha Perez

## 2017-06-13 VITALS
OXYGEN SATURATION: 93 % | HEART RATE: 78 BPM | SYSTOLIC BLOOD PRESSURE: 110 MMHG | WEIGHT: 156 LBS | RESPIRATION RATE: 18 BRPM | BODY MASS INDEX: 24.48 KG/M2 | DIASTOLIC BLOOD PRESSURE: 56 MMHG | HEIGHT: 67 IN | TEMPERATURE: 98.7 F

## 2017-06-13 LAB
ANION GAP SERPL CALCULATED.3IONS-SCNC: 6 MMOL/L (ref 3–14)
BUN SERPL-MCNC: 18 MG/DL (ref 7–30)
CALCIUM SERPL-MCNC: 7.9 MG/DL (ref 8.5–10.1)
CHLORIDE SERPL-SCNC: 118 MMOL/L (ref 94–109)
CO2 SERPL-SCNC: 22 MMOL/L (ref 20–32)
CREAT SERPL-MCNC: 0.72 MG/DL (ref 0.52–1.04)
ERYTHROCYTE [DISTWIDTH] IN BLOOD BY AUTOMATED COUNT: 14.1 % (ref 10–15)
GFR SERPL CREATININE-BSD FRML MDRD: 78 ML/MIN/1.7M2
GLUCOSE SERPL-MCNC: 97 MG/DL (ref 70–99)
HCT VFR BLD AUTO: 26.4 % (ref 35–47)
HGB BLD-MCNC: 8.2 G/DL (ref 11.7–15.7)
HGB BLD-MCNC: 8.6 G/DL (ref 11.7–15.7)
HGB BLD-MCNC: 9.1 G/DL (ref 11.7–15.7)
INR PPP: 1.39 (ref 0.86–1.14)
MCH RBC QN AUTO: 31.4 PG (ref 26.5–33)
MCHC RBC AUTO-ENTMCNC: 32.6 G/DL (ref 31.5–36.5)
MCV RBC AUTO: 96 FL (ref 78–100)
PLATELET # BLD AUTO: 58 10E9/L (ref 150–450)
POTASSIUM SERPL-SCNC: 3.9 MMOL/L (ref 3.4–5.3)
RBC # BLD AUTO: 2.74 10E12/L (ref 3.8–5.2)
SODIUM SERPL-SCNC: 146 MMOL/L (ref 133–144)
UPPER GI ENDOSCOPY: NORMAL
WBC # BLD AUTO: 2.4 10E9/L (ref 4–11)

## 2017-06-13 PROCEDURE — 85018 HEMOGLOBIN: CPT | Performed by: HOSPITALIST

## 2017-06-13 PROCEDURE — S0164 INJECTION PANTROPRAZOLE: HCPCS | Performed by: INTERNAL MEDICINE

## 2017-06-13 PROCEDURE — 36415 COLL VENOUS BLD VENIPUNCTURE: CPT | Performed by: INTERNAL MEDICINE

## 2017-06-13 PROCEDURE — 25000128 H RX IP 250 OP 636: Performed by: INTERNAL MEDICINE

## 2017-06-13 PROCEDURE — A9270 NON-COVERED ITEM OR SERVICE: HCPCS | Mod: GY | Performed by: HOSPITALIST

## 2017-06-13 PROCEDURE — 85610 PROTHROMBIN TIME: CPT | Performed by: HOSPITALIST

## 2017-06-13 PROCEDURE — 80048 BASIC METABOLIC PNL TOTAL CA: CPT | Performed by: HOSPITALIST

## 2017-06-13 PROCEDURE — 85018 HEMOGLOBIN: CPT | Performed by: INTERNAL MEDICINE

## 2017-06-13 PROCEDURE — 25000131 ZZH RX MED GY IP 250 OP 636 PS 637: Mod: GY | Performed by: INTERNAL MEDICINE

## 2017-06-13 PROCEDURE — 36415 COLL VENOUS BLD VENIPUNCTURE: CPT | Performed by: HOSPITALIST

## 2017-06-13 PROCEDURE — 85027 COMPLETE CBC AUTOMATED: CPT | Performed by: HOSPITALIST

## 2017-06-13 PROCEDURE — 25000132 ZZH RX MED GY IP 250 OP 250 PS 637: Mod: GY | Performed by: HOSPITALIST

## 2017-06-13 PROCEDURE — 25000125 ZZHC RX 250: Performed by: INTERNAL MEDICINE

## 2017-06-13 PROCEDURE — 99239 HOSP IP/OBS DSCHRG MGMT >30: CPT | Performed by: HOSPITALIST

## 2017-06-13 RX ORDER — PANTOPRAZOLE SODIUM 40 MG/1
40 TABLET, DELAYED RELEASE ORAL
Status: DISCONTINUED | OUTPATIENT
Start: 2017-06-13 | End: 2017-06-13 | Stop reason: HOSPADM

## 2017-06-13 RX ADMIN — LISINOPRIL 2.5 MG: 2.5 TABLET ORAL at 08:24

## 2017-06-13 RX ADMIN — OCTREOTIDE ACETATE 50 MCG/HR: 200 INJECTION, SOLUTION INTRAVENOUS; SUBCUTANEOUS at 06:57

## 2017-06-13 RX ADMIN — PANTOPRAZOLE SODIUM 8 MG/HR: 40 INJECTION, POWDER, FOR SOLUTION INTRAVENOUS at 03:04

## 2017-06-13 RX ADMIN — PANTOPRAZOLE SODIUM 40 MG: 40 TABLET, DELAYED RELEASE ORAL at 08:24

## 2017-06-13 RX ADMIN — CEFTRIAXONE 1 G: 1 INJECTION, POWDER, FOR SOLUTION INTRAMUSCULAR; INTRAVENOUS at 08:25

## 2017-06-13 NOTE — PROGRESS NOTES
GI     The Provation note from yesterday's EGD has not yet appeared in Epic.     Findings included:  -Large esophageal varix, without stigmata of bleeding.   -Portal HTN gastropathy, scant hematin throughout the stomach without ulceration or active bleeding.  Hemoclip from 10/2016 EGD.   -Normal duodenum.     Recs:  ADAT  Avoid NSAIDs  Will arrange EGD and colonoscopy as outpt.     Please call with questions.  Will sign off.     Frank Lopez DO   [facility]  Cell [phone]

## 2017-06-13 NOTE — DISCHARGE SUMMARY
DATE OF ADMISSION:   6/12/2017.      DATE OF DISCHARGE:  6/13/2017.      PRIMARY CARE PHYSICIAN:  Dr. Liang Glover.      PRIMARY GASTROENTEROLOGIST:  Dr. Kelly Traylor.      DIAGNOSIS AT DISCHARGE:  Hematemesis due to suspected esophageal variceal bleed.      ADDITIONAL DIAGNOSES:   1.  Acute blood loss anemia due to upper gastrointestinal bleed.   2.  Hypernatremia.   3.  Pancytopenia likely due to cirrhosis.   4.  Hypertension.   5.  History of hemorrhagic stroke.   6.  Diastolic dysfunction.   7.  Iron deficiency anemia.      SIGNIFICANT TESTS DONE IN HOSPITAL:  Labs including;    1.  CBC, CMP which showed hemoglobin of 10.8 at presentation which dropped to 8.2 and remained stable and in fact is 9.1 now.     2.  Thrombocytopenia with a platelet count of 58,000.     3.  Mild hypernatremia with serum sodium of 146.   4.  Upper endoscopy was done which showed grade 2 varices noted in the lower third of the esophagus, no indication of recent variceal bleed.  Moderate portal hypertensive gastropathy.      HOSPITAL COURSE:  Catina Barrow is a 76-year-old woman with a medical history significant for liver cirrhosis and esophageal varices with prior variceal bleed presented to the ER with 3 episodes of hematemesis.  Her hemoglobin was 10.8 at presentation.  She was started on PPI and octreotide drip and was admitted to ICU.  Her hemoglobin dropped to 8.2 in first 24 hours but subsequently has remained stable.  Upper endoscopy was done there was no active bleed noted.  The patient did not require blood transfusion.      After upper endoscopy, she was started on diet which she tolerated without any further bleed.  She was placed on Rocephin for prophylaxis given variceal bleed which was discontinued as per GI recommendation and is being discharged today since she has tolerated diet, there is no nausea, no abdominal pain and hemoglobin level has remained stable.        The patient was also taking any NSAIDs prior to  admission which she was advised to avoid.  She will follow up with GI to have endoscopy within a month.  Recommended to have a followup hemoglobin and hematocrit to follow up on her anemia.      PRIOR TO ADMISSION MEDICATIONS:  For her stable medical conditions including her hypertension was resumed.      PHYSICAL EXAMINATION ON DAY OF DISCHARGE:   GENERAL:  Alert, awake, oriented, does not appear to be in distress.   VITAL SIGNS:  Blood pressure 110/56, heart rate 69, temperature 98.7, respirations 18, pulse ox 93 on room air.   HEENT:  PERRLA, EOMI.  Oral mucosa moist.   NECK:  Supple.   LUNGS:  Bilateral equal air entry, clear to auscultation.   CARDIOVASCULAR:  S1, S2, regular, soft systolic murmur, no tachycardia.   ABDOMEN:  Soft, nontender, bowel tones active.   MUSCULOSKELETAL:  No edema.   SKIN:  Pale.   NEURO:  Nonfocal.      MEDICATIONS AT THE OF DISCHARGE:   1.  Protonix 40 mg by mouth daily.   2.  Sertraline 100 mg by mouth daily.   3.  Norvasc 5 mg by mouth daily.   4.  Lisinopril 2.5 mg by mouth daily.   5.  Ferrous sulfate 325 mg by mouth twice a day.   6.  Ascorbic acid 500 mg by mouth daily.      DISCHARGE PLAN:  The patient is being discharged home; will be following up with primary GI as above.      Time spent on day of discharge was approximately 35 minutes.         PEG STINSON MD             D: 2017 16:04   T: 2017 18:57   MT: [id number]      Name:     ALEKSANDR WESTON   MRN:      [id number]        Account:        [id number]   :      1940           Admit Date:     [id number]                                  Discharge Date: 2017      Document: [id number]       cc: Liang Traylor MD

## 2017-06-13 NOTE — PLAN OF CARE
Problem: Goal Outcome Summary  Goal: Goal Outcome Summary  Pt is A/Ox4.  Up with SBA.  Clear liquid diet.  Denies nausea.  No emesis.  PIV infusing NS at 125 ml/hr, protonix at 10 ml/hr and octreotide at 10 ml/hr.  Continue to monitor.

## 2017-06-13 NOTE — PLAN OF CARE
Problem: Goal Outcome Summary  Goal: Goal Outcome Summary  Outcome: Adequate for Discharge Date Met:  06/13/17  A&Ox4. SBA. Tolerated diet. Denies CP/SOB. Went through AVS, pt verbalized understanding. Hgb 9.1, no signs of active bleeding. Belongings sent with the pt. Discharged home. Family provided transportation.

## 2017-06-13 NOTE — PLAN OF CARE
Problem: Goal Outcome Summary  Goal: Goal Outcome Summary  Outcome: Improving  Patient a/o up with sba. IV infusing until bag of med done. Eat meal with no symptoms.

## 2017-06-17 LAB — INTERPRETATION ECG - MUSE: NORMAL

## 2017-06-19 ENCOUNTER — APPOINTMENT (OUTPATIENT)
Age: 77
Setting detail: DERMATOLOGY
End: 2017-06-20

## 2017-06-19 DIAGNOSIS — L57.0 ACTINIC KERATOSIS: ICD-10-CM

## 2017-06-19 DIAGNOSIS — L85.8 OTHER SPECIFIED EPIDERMAL THICKENING: ICD-10-CM

## 2017-06-19 PROBLEM — D48.5 NEOPLASM OF UNCERTAIN BEHAVIOR OF SKIN: Status: ACTIVE | Noted: 2017-06-19

## 2017-06-19 PROCEDURE — OTHER COUNSELING: OTHER

## 2017-06-19 PROCEDURE — OTHER MIPS QUALITY: OTHER

## 2017-06-19 PROCEDURE — 17000 DESTRUCT PREMALG LESION: CPT

## 2017-06-19 PROCEDURE — 11101: CPT

## 2017-06-19 PROCEDURE — 11100: CPT | Mod: 59

## 2017-06-19 PROCEDURE — OTHER BIOPSY BY SHAVE METHOD: OTHER

## 2017-06-19 PROCEDURE — 17003 DESTRUCT PREMALG LES 2-14: CPT

## 2017-06-19 PROCEDURE — OTHER LIQUID NITROGEN: OTHER

## 2017-06-19 ASSESSMENT — LOCATION ZONE DERM: LOCATION ZONE: LEG

## 2017-06-19 ASSESSMENT — LOCATION DETAILED DESCRIPTION DERM
LOCATION DETAILED: LEFT ANTERIOR DISTAL THIGH
LOCATION DETAILED: LEFT ANTERIOR PROXIMAL THIGH

## 2017-06-19 ASSESSMENT — LOCATION SIMPLE DESCRIPTION DERM: LOCATION SIMPLE: LEFT THIGH

## 2017-06-19 NOTE — PROCEDURE: LIQUID NITROGEN
Detail Level: Simple
Render Post-Care Instructions In Note?: yes
Post-Care Instructions: I reviewed with the patient in detail post-care instructions. (Written instructions given) Patient is to wear sun protection, and avoid picking at any of the treated lesions. Pt may apply Vaseline to crusted or scabbing areas.
Number Of Freeze-Thaw Cycles: 1 freeze-thaw cycle
Consent: The patient's consent was obtained including but not limited to risks of crusting, scabbing, blistering, scarring, darker or lighter pigmentary change, recurrence, incomplete removal and infection.
Duration Of Freeze Thaw-Cycle (Seconds): 10
Total Number Of Aks Treated: 4

## 2017-06-19 NOTE — PROCEDURE: BIOPSY BY SHAVE METHOD
Anesthesia Volume In Cc (Will Not Render If 0): 0.6
X Size Of Lesion In Cm: 0
Anesthesia Type: 1% lidocaine with epinephrine and a 1:10 solution of 8.4% sodium bicarbonate
Bill For Surgical Tray: no
Post-Care Instructions: I reviewed with the patient in detail post-care instructions. Patient is to keep the biopsy site dry overnight, and then apply H2O2, Vaseline and a bandage daily until healed.
Billing Type: Third-Party Bill
Silver Nitrate Text: The wound bed was treated with silver nitrate after the biopsy was performed.
Wound Care: Vaseline
Type Of Destruction Used: Electrodesiccation
Biopsy Method: Double edge Personna blades
Curettage Text: The wound bed was treated with curettage after the biopsy was performed.
Consent: Written consent was obtained and risks were reviewed including but not limited to scarring, infection, bleeding, scabbing, incomplete removal, nerve damage and allergy to anesthesia.
Detail Level: Detailed
Biopsy Type: H and E
Cryotherapy Text: The wound bed was treated with cryotherapy after the biopsy was performed.
Electrodesiccation And Curettage Text: The wound bed was treated with electrodesiccation and curettage after the biopsy was performed.
Electrodesiccation Text: The wound bed was treated with electrodesiccation after the biopsy was performed.
Notification Instructions: Patient will be notified of biopsy results. However, patient instructed to call the office if not contacted within 2 weeks.
Render Post-Care Instructions In Note?: yes
Hemostasis: Electrocautery
Dressing: pressure dressing with telfa
Body Location Override (Optional - Billing Will Still Be Based On Selected Body Map Location If Applicable): left proximal anterior thigh
Body Location Override (Optional - Billing Will Still Be Based On Selected Body Map Location If Applicable): left lateral thigh
Body Location Override (Optional - Billing Will Still Be Based On Selected Body Map Location If Applicable): right lateral thigh, proximal
Body Location Override (Optional - Billing Will Still Be Based On Selected Body Map Location If Applicable): right lateral thigh, distal

## 2017-06-19 NOTE — PROCEDURE: MIPS QUALITY
Detail Level: Detailed
Quality 110: Preventive Care And Screening: Influenza Immunization: Influenza Immunization previously received during influenza season
Quality 265: Biopsy Follow-Up: Biopsy results reviewed, communicated, tracked, and documented
Quality 431: Preventive Care And Screening: Unhealthy Alcohol Use - Screening: Patient screened for unhealthy alcohol use using a single question and scores less than 2 times per year
Quality 226: Preventive Care And Screening: Tobacco Use: Screening And Cessation Intervention: Patient screened for tobacco and never smoked
Quality 130: Documentation Of Current Medications In The Medical Record: Current Medications Documented
Quality 131: Pain Assessment And Follow-Up: Pain assessment using a standardized tool is documented as negative, no follow-up plan required

## 2017-07-07 ENCOUNTER — APPOINTMENT (OUTPATIENT)
Age: 77
Setting detail: DERMATOLOGY
End: 2017-07-08

## 2017-07-07 PROBLEM — C44.719 BASAL CELL CARCINOMA OF SKIN OF LEFT LOWER LIMB, INCLUDING HIP: Status: ACTIVE | Noted: 2017-07-07

## 2017-07-07 PROCEDURE — OTHER EXCISION: OTHER

## 2017-07-07 PROCEDURE — OTHER COUNSELING: OTHER

## 2017-07-07 PROCEDURE — 13121 CMPLX RPR S/A/L 2.6-7.5 CM: CPT

## 2017-07-07 PROCEDURE — OTHER MIPS QUALITY: OTHER

## 2017-07-07 PROCEDURE — 11603 EXC TR-EXT MAL+MARG 2.1-3 CM: CPT

## 2017-07-07 NOTE — PROCEDURE: EXCISION
Body Location Override (Optional - Billing Will Still Be Based On Selected Body Map Location If Applicable): left proximal anterior thigh

## 2017-07-07 NOTE — PROCEDURE: MIPS QUALITY
Detail Level: Detailed
Quality 431: Preventive Care And Screening: Unhealthy Alcohol Use - Screening: Patient screened for unhealthy alcohol use using a single question and scores less than 2 times per year
Quality 265: Biopsy Follow-Up: Biopsy results reviewed, communicated, tracked, and documented
Quality 110: Preventive Care And Screening: Influenza Immunization: Influenza Immunization previously received during influenza season
Quality 131: Pain Assessment And Follow-Up: Pain assessment using a standardized tool is documented as negative, no follow-up plan required
Quality 226: Preventive Care And Screening: Tobacco Use: Screening And Cessation Intervention: Patient screened for tobacco and never smoked
Quality 130: Documentation Of Current Medications In The Medical Record: Current Medications Documented

## 2017-07-27 ENCOUNTER — APPOINTMENT (OUTPATIENT)
Age: 77
Setting detail: DERMATOLOGY
End: 2017-07-29

## 2017-07-27 DIAGNOSIS — Z48.02 ENCOUNTER FOR REMOVAL OF SUTURES: ICD-10-CM

## 2017-07-27 PROBLEM — C44.712 BASAL CELL CARCINOMA OF SKIN OF RIGHT LOWER LIMB, INCLUDING HIP: Status: ACTIVE | Noted: 2017-07-27

## 2017-07-27 PROCEDURE — OTHER SUTURE REMOVAL (GLOBAL PERIOD): OTHER

## 2017-07-27 PROCEDURE — OTHER CURETTAGE AND DESTRUCTION: OTHER

## 2017-07-27 PROCEDURE — OTHER MIPS QUALITY: OTHER

## 2017-07-27 PROCEDURE — 17262 DSTRJ MAL LES T/A/L 1.1-2.0: CPT

## 2017-07-27 PROCEDURE — OTHER COUNSELING: OTHER

## 2017-07-27 PROCEDURE — 17262 DSTRJ MAL LES T/A/L 1.1-2.0: CPT | Mod: 76

## 2017-07-27 ASSESSMENT — LOCATION SIMPLE DESCRIPTION DERM: LOCATION SIMPLE: LEFT THIGH

## 2017-07-27 ASSESSMENT — LOCATION DETAILED DESCRIPTION DERM: LOCATION DETAILED: LEFT ANTERIOR PROXIMAL THIGH

## 2017-07-27 ASSESSMENT — LOCATION ZONE DERM: LOCATION ZONE: LEG

## 2017-07-27 NOTE — PROCEDURE: SUTURE REMOVAL (GLOBAL PERIOD)
Body Location Override (Optional - Billing Will Still Be Based On Selected Body Map Location If Applicable): left proximal anterior thigh
Add 23970 Cpt? (Important Note: In 2017 The Use Of 49809 Is Being Tracked By Cms To Determine Future Global Period Reimbursement For Global Periods): no
Detail Level: Detailed

## 2017-07-27 NOTE — PROCEDURE: CURETTAGE AND DESTRUCTION
Anesthesia Volume In Cc: 1
Body Location Override (Optional - Billing Will Still Be Based On Selected Body Map Location If Applicable): right distal lateral thigh
Consent was obtained from the patient. The risks, benefits and alternatives to therapy were discussed in detail. Specifically, the risks of infection, scarring, bleeding, prolonged wound healing, nerve injury, incomplete removal, allergy to anesthesia and recurrence were addressed. Alternatives to ED&C, such as: surgical removal and XRT were also discussed.  Prior to the procedure, the treatment site was clearly identified and confirmed by the patient. All components of Universal Protocol/PAUSE Rule completed.
What Was Performed First?: Destruction
Detail Level: Detailed
Number Of Curettages: 3
Bill For Surgical Tray: no
Cautery Type: electrocautery (heat cautery)
Additional Information: (Optional): The wound was cleaned, and a vaseline and a pressure dressing was applied.  The patient received detailed post-op instructions.
Anesthesia Type: 1% lidocaine with epinephrine and a 1:10 solution of 8.4% sodium bicarbonate
Render Post-Care Instructions In Note?: yes
Size Of Lesion In Cm: 1.4
Post-Care Instructions: I reviewed with the patient in detail post-care instructions. Patient is to keep the area dry for 48 hours, and not to engage in any swimming until the area is healed. Should the patient develop any fevers, chills, bleeding, severe pain patient will contact the office immediately.
Body Location Override (Optional - Billing Will Still Be Based On Selected Body Map Location If Applicable): right proximal lateral thigh
Bill As A Line Item Or As Units: Line Item
Size Of Lesion After Curettage: 1.2
Size Of Lesion After Curettage: 1.6
Cautery Type: electrodesiccation

## 2017-07-27 NOTE — PROCEDURE: MIPS QUALITY
Detail Level: Detailed
Quality 431: Preventive Care And Screening: Unhealthy Alcohol Use - Screening: Patient screened for unhealthy alcohol use using a single question and scores less than 2 times per year
Quality 226: Preventive Care And Screening: Tobacco Use: Screening And Cessation Intervention: Patient screened for tobacco and never smoked
Quality 130: Documentation Of Current Medications In The Medical Record: Current Medications Documented
Quality 110: Preventive Care And Screening: Influenza Immunization: Influenza Immunization previously received during influenza season
Quality 131: Pain Assessment And Follow-Up: Pain assessment using a standardized tool is documented as negative, no follow-up plan required

## 2017-09-08 ENCOUNTER — APPOINTMENT (OUTPATIENT)
Age: 77
Setting detail: DERMATOLOGY
End: 2017-09-10

## 2017-09-08 DIAGNOSIS — H61.11 ACQUIRED DEFORMITY OF PINNA: ICD-10-CM

## 2017-09-08 PROBLEM — H61.113 ACQUIRED DEFORMITY OF PINNA, BILATERAL: Status: ACTIVE | Noted: 2017-09-08

## 2017-09-08 PROCEDURE — OTHER COUNSELING: OTHER

## 2017-09-08 PROCEDURE — OTHER EARLOBE REPAIR COSMETIC: OTHER

## 2017-09-08 PROCEDURE — OTHER OTHER: OTHER

## 2017-09-08 ASSESSMENT — LOCATION SIMPLE DESCRIPTION DERM
LOCATION SIMPLE: RIGHT EAR
LOCATION SIMPLE: LEFT EAR

## 2017-09-08 ASSESSMENT — LOCATION DETAILED DESCRIPTION DERM
LOCATION DETAILED: LEFT ANTERIOR EARLOBE
LOCATION DETAILED: RIGHT ANTERIOR EARLOBE

## 2017-09-08 ASSESSMENT — LOCATION ZONE DERM: LOCATION ZONE: EAR

## 2017-09-08 NOTE — PROCEDURE: EARLOBE REPAIR COSMETIC
Anesthesia Volume In Cc: 0
Post-Care Instructions: I reviewed with the patient in detail post-care instructions. Patient is not to engage in any heavy lifting, exercise, or swimming for the next 14 days. Should the patient develop any fevers, chills, bleeding, severe pain patient will contact the office immediately.
Anesthesia Type: 1% lidocaine with epinephrine and a 1:10 solution of 8.4% sodium bicarbonate
Wound Care: Vaseline
Detail Level: Detailed
Body Location Override (Optional - Billing Will Still Be Based On Selected Body Map Location If Applicable): left earlobe
Estimated Blood Loss (Cc): minimal
Wound Length (In Cm): 1
Primary Defect Length (In Cm): 0.2
Suture Removal: 7 days
Consent: The rationale for Repairs was explained to the patient and consent was obtained. The risks, benefits and alternatives to therapy were discussed in detail. Specifically, the risks of infection, scarring, bleeding, prolonged wound healing, incomplete removal, allergy to anesthesia, nerve injury and recurrence were addressed. Prior to the procedure, the treatment site was clearly identified and confirmed by the patient. All components of Universal Protocol/PAUSE Rule completed.
Epidermal Closure: simple interrupted
Repair Type: Simple Repair
Hemostasis: Electrocautery
Wound Length (In Cm): 0.6
Epidermal Sutures: 6-0 Prolene
Repair Type: Complex Repair
Price (Use Numbers Only, No Special Characters Or $): 500.00
Body Location Override (Optional - Billing Will Still Be Based On Selected Body Map Location If Applicable): right earlobe
Primary Defect Length (In Cm): 0.5
Deep Sutures: 6-0 Monocryl

## 2017-09-08 NOTE — HPI: COSMETIC (EARLOBE REPAIR)
Additional History: She mentions that she has lost multiple earring out of her left earlobe as it is stretched and the piercing hole is too large. She has lesser stretching on her right earlobe. She enjoys wearing earrings and is requesting repair today.

## 2017-09-08 NOTE — PROCEDURE: OTHER
Note Text (......Xxx Chief Complaint.): This diagnosis correlates with the
Detail Level: Detailed
Other (Free Text): The patient was instructed that she cannot have ears re-pierced for at least 6 months following surgery.

## 2017-09-14 ENCOUNTER — APPOINTMENT (OUTPATIENT)
Age: 77
Setting detail: DERMATOLOGY
End: 2017-10-03

## 2017-09-14 DIAGNOSIS — Z48.02 ENCOUNTER FOR REMOVAL OF SUTURES: ICD-10-CM

## 2017-09-14 PROCEDURE — OTHER MIPS QUALITY: OTHER

## 2017-09-14 PROCEDURE — OTHER SUTURE REMOVAL (GLOBAL PERIOD): OTHER

## 2017-09-14 PROCEDURE — OTHER COUNSELING: OTHER

## 2017-09-14 ASSESSMENT — LOCATION ZONE DERM: LOCATION ZONE: EAR

## 2017-09-14 ASSESSMENT — LOCATION SIMPLE DESCRIPTION DERM: LOCATION SIMPLE: RIGHT EAR

## 2017-09-14 ASSESSMENT — LOCATION DETAILED DESCRIPTION DERM: LOCATION DETAILED: RIGHT ANTERIOR EARLOBE

## 2017-09-14 NOTE — PROCEDURE: SUTURE REMOVAL (GLOBAL PERIOD)
Body Location Override (Optional - Billing Will Still Be Based On Selected Body Map Location If Applicable): L/R earlobes
Detail Level: Detailed
Add 81409 Cpt? (Important Note: In 2017 The Use Of 56498 Is Being Tracked By Cms To Determine Future Global Period Reimbursement For Global Periods): no

## 2017-09-14 NOTE — PROCEDURE: MIPS QUALITY
Quality 110: Preventive Care And Screening: Influenza Immunization: Influenza Immunization previously received during influenza season
Detail Level: Detailed
Quality 226: Preventive Care And Screening: Tobacco Use: Screening And Cessation Intervention: Patient screened for tobacco and never smoked
Quality 431: Preventive Care And Screening: Unhealthy Alcohol Use - Screening: Patient screened for unhealthy alcohol use using a single question and scores less than 2 times per year
Quality 130: Documentation Of Current Medications In The Medical Record: Current Medications Documented
Quality 131: Pain Assessment And Follow-Up: Pain assessment using a standardized tool is documented as negative, no follow-up plan required

## 2018-12-14 ENCOUNTER — APPOINTMENT (OUTPATIENT)
Age: 78
Setting detail: DERMATOLOGY
End: 2018-12-15

## 2018-12-14 DIAGNOSIS — L57.0 ACTINIC KERATOSIS: ICD-10-CM

## 2018-12-14 PROBLEM — C44.622 SQUAMOUS CELL CARCINOMA OF SKIN OF RIGHT UPPER LIMB, INCLUDING SHOULDER: Status: ACTIVE | Noted: 2018-12-14

## 2018-12-14 PROBLEM — D48.5 NEOPLASM OF UNCERTAIN BEHAVIOR OF SKIN: Status: ACTIVE | Noted: 2018-12-14

## 2018-12-14 PROCEDURE — 11100: CPT | Mod: 59

## 2018-12-14 PROCEDURE — OTHER COUNSELING: OTHER

## 2018-12-14 PROCEDURE — OTHER MIPS QUALITY: OTHER

## 2018-12-14 PROCEDURE — OTHER BIOPSY BY SHAVE METHOD: OTHER

## 2018-12-14 PROCEDURE — OTHER EXCISION: OTHER

## 2018-12-14 PROCEDURE — 11604 EXC TR-EXT MAL+MARG 3.1-4 CM: CPT

## 2018-12-14 PROCEDURE — 13121 CMPLX RPR S/A/L 2.6-7.5 CM: CPT

## 2018-12-14 ASSESSMENT — LOCATION DETAILED DESCRIPTION DERM: LOCATION DETAILED: RIGHT PROXIMAL POSTERIOR UPPER ARM

## 2018-12-14 ASSESSMENT — LOCATION SIMPLE DESCRIPTION DERM: LOCATION SIMPLE: RIGHT POSTERIOR UPPER ARM

## 2018-12-14 ASSESSMENT — LOCATION ZONE DERM: LOCATION ZONE: ARM

## 2018-12-14 NOTE — PROCEDURE: BIOPSY BY SHAVE METHOD
Depth Of Biopsy: dermis
Wound Care: Petrolatum
Dressing: pressure dressing with telfa
Electrodesiccation Text: The wound bed was treated with electrodesiccation after the biopsy was performed.
Post-Care Instructions: I verbally reviewed with the patient in detail post-care instructions. Patient is to keep the biopsy site dry overnight, and then apply H2O2, Vaseline and a bandage daily until healed.
Detail Level: Detailed
Anesthesia Volume In Cc (Will Not Render If 0): 1.5
X Size Of Lesion In Cm: 1
Electrodesiccation And Curettage Text: The wound bed was treated with electrodesiccation and curettage after the biopsy was performed.
Notification Instructions: Patient will be notified of biopsy results. However, patient instructed to call the office if not contacted within 2 weeks.
Destruction After The Procedure: No
Biopsy Type: H and E
Was A Bandage Applied: Yes
Billing Type: Third-Party Bill
Anesthesia Type: 1% lidocaine with epinephrine and a 1:10 solution of 8.4% sodium bicarbonate
Cryotherapy Text: The wound bed was treated with cryotherapy after the biopsy was performed.
Body Location Override (Optional - Billing Will Still Be Based On Selected Body Map Location If Applicable): R ventral upper arm (distal)
Curettage Text: The wound bed was treated with curettage after the biopsy was performed.
Additional Anesthesia Volume In Cc (Will Not Render If 0): 0
Biopsy Method: double edge Personna blade
Silver Nitrate Text: The wound bed was treated with silver nitrate after the biopsy was performed.
Hemostasis: Electrocautery
Consent: Verbal consent was obtained and risks were reviewed including but not limited to scarring, infection, bleeding, scabbing, incomplete removal, nerve damage and allergy to anesthesia.
Type Of Destruction Used: Electrodesiccation

## 2018-12-14 NOTE — PROCEDURE: MIPS QUALITY
Quality 130: Documentation Of Current Medications In The Medical Record: Current Medications Documented
Detail Level: Detailed
Quality 431: Preventive Care And Screening: Unhealthy Alcohol Use - Screening: Patient screened for unhealthy alcohol use using a single question and scores less than 2 times per year
Quality 131: Pain Assessment And Follow-Up: Pain assessment documented as positive using a standardized tool AND a follow-up plan is documented
Quality 226: Preventive Care And Screening: Tobacco Use: Screening And Cessation Intervention: Patient screened for tobacco and never smoked
Quality 265: Biopsy Follow-Up: Biopsy results reviewed, communicated, tracked, and documented
Quality 110: Preventive Care And Screening: Influenza Immunization: Influenza Immunization previously received during influenza season

## 2018-12-14 NOTE — PROCEDURE: EXCISION
Surgeon Performing The Repair (Optional): Dr. Barbie Anand MD
Complex Repair And Epidermal Autograft Text: The defect edges were debeveled with a #15 scalpel blade.  The primary defect was closed partially with a complex linear closure.  Given the location of the defect, shape of the defect and the proximity to free margins an epidermal autograft was deemed most appropriate to repair the remaining defect.  The graft was trimmed to fit the size of the remaining defect.  The graft was then placed in the primary defect, oriented appropriately, and sutured into place.
Skin Substitute Units (Will Override Primary Defect Units If Greater Than 0): 0
Mucosal Advancement Flap Text: Given the location of the defect, shape of the defect and the proximity to free margins a mucosal advancement flap was deemed most appropriate. Incisions were made with a 15 blade scalpel in the appropriate fashion along the cutaneous vermilion border and the mucosal lip. The remaining actinically damaged mucosal tissue was excised.  The mucosal advancement flap was then elevated to the gingival sulcus with care taken to preserve the neurovascular structures and advanced into the primary defect. Care was taken to ensure that precise realignment of the vermilion border was achieved.
Island Pedicle Flap Text: The defect edges were debeveled with a #15 scalpel blade.  Given the location of the defect, shape of the defect and the proximity to free margins an island pedicle advancement flap was deemed most appropriate.  Using a sterile surgical marker, an appropriate advancement flap was drawn incorporating the defect, outlining the appropriate donor tissue and placing the expected incisions within the relaxed skin tension lines where possible.    The area thus outlined was incised deep to adipose tissue with a #15 scalpel blade.  The skin margins were undermined to an appropriate distance in all directions around the primary defect and laterally outward around the island pedicle utilizing iris scissors.  There was minimal undermining beneath the pedicle flap.
Muscle Hinge Flap Text: The defect edges were debeveled with a #15 scalpel blade.  Given the size, depth and location of the defect and the proximity to free margins a muscle hinge flap was deemed most appropriate.  Using a sterile surgical marker, an appropriate hinge flap was drawn incorporating the defect. The area thus outlined was incised with a #15 scalpel blade.  The skin margins were undermined to an appropriate distance in all directions utilizing iris scissors.
Complex Repair And Skin Substitute Graft Text: The defect edges were debeveled with a #15 scalpel blade.  The primary defect was closed partially with a complex linear closure.  Given the location of the remaining defect, shape of the defect and the proximity to free margins a skin substitute graft was deemed most appropriate to repair the remaining defect.  The graft was trimmed to fit the size of the remaining defect.  The graft was then placed in the primary defect, oriented appropriately, and sutured into place.
Validate That Anesthesia Volume Is Not Zero (If You Leave At 0 It Will Not Render In Note): No
O-Z Plasty Text: The defect edges were debeveled with a #15 scalpel blade.  Given the location of the defect, shape of the defect and the proximity to free margins an O-Z plasty (double transposition flap) was deemed most appropriate.  Using a sterile surgical marker, the appropriate transposition flaps were drawn incorporating the defect and placing the expected incisions within the relaxed skin tension lines where possible.    The area thus outlined was incised deep to adipose tissue with a #15 scalpel blade.  The skin margins were undermined to an appropriate distance in all directions utilizing iris scissors.  Hemostasis was achieved with electrocautery.  The flaps were then transposed into place, one clockwise and the other counterclockwise, and anchored with interrupted buried subcutaneous sutures.
Size Of Lesion In Cm: 2.1
Show Date Of Previous Biopsy Variable: Yes
Lip Wedge Excision Repair Text: Given the location of the defect and the proximity to free margins a full thickness wedge repair was deemed most appropriate.  Using a sterile surgical marker, the appropriate repair was drawn incorporating the defect and placing the expected incisions perpendicular to the vermilion border.  The vermilion border was also meticulously outlined to ensure appropriate reapproximation during the repair.  The area thus outlined was incised through and through with a #15 scalpel blade.  The muscularis and dermis were reaproximated with deep sutures following hemostasis. Care was taken to realign the vermilion border before proceeding with the superficial closure.  Once the vermilion was realigned the superfical and mucosal closure was finished.
Dorsal Nasal Flap Text: The defect edges were debeveled with a #15 scalpel blade.  Given the location of the defect and the proximity to free margins a dorsal nasal flap was deemed most appropriate.  Using a sterile surgical marker, an appropriate dorsal nasal flap was drawn around the defect.    The area thus outlined was incised deep to adipose tissue with a #15 scalpel blade.  The skin margins were undermined to an appropriate distance in all directions utilizing iris scissors.
O-L Flap Text: The defect edges were debeveled with a #15 scalpel blade.  Given the location of the defect, shape of the defect and the proximity to free margins an O-L flap was deemed most appropriate.  Using a sterile surgical marker, an appropriate advancement flap was drawn incorporating the defect and placing the expected incisions within the relaxed skin tension lines where possible.    The area thus outlined was incised deep to adipose tissue with a #15 scalpel blade.  The skin margins were undermined to an appropriate distance in all directions utilizing iris scissors.
X Size Of Lesion In Cm (Optional): 1.9
Paramedian Forehead Flap Text: A decision was made to reconstruct the defect utilizing an interpolation axial flap and a staged reconstruction.  A telfa template was made of the defect.  This telfa template was then used to outline the paramedian forehead pedicle flap.  The donor area for the pedicle flap was then injected with anesthesia.  The flap was excised through the skin and subcutaneous tissue down to the layer of the underlying musculature.  The pedicle flap was carefully excised within this deep plane to maintain its blood supply.  The edges of the donor site were undermined.   The donor site was closed in a primary fashion.  The pedicle was then rotated into position and sutured.  Once the tube was sutured into place, adequate blood supply was confirmed with blanching and refill.  The pedicle was then wrapped with xeroform gauze and dressed appropriately with a telfa and gauze bandage to ensure continued blood supply and protect the attached pedicle.
Double Island Pedicle Flap Text: The defect edges were debeveled with a #15 scalpel blade.  Given the location of the defect, shape of the defect and the proximity to free margins a double island pedicle advancement flap was deemed most appropriate.  Using a sterile surgical marker, an appropriate advancement flap was drawn incorporating the defect, outlining the appropriate donor tissue and placing the expected incisions within the relaxed skin tension lines where possible.    The area thus outlined was incised deep to adipose tissue with a #15 scalpel blade.  The skin margins were undermined to an appropriate distance in all directions around the primary defect and laterally outward around the island pedicle utilizing iris scissors.  There was minimal undermining beneath the pedicle flap.
Bi-Rhombic Flap Text: The defect edges were debeveled with a #15 scalpel blade.  Given the location of the defect and the proximity to free margins a bi-rhombic flap was deemed most appropriate.  Using a sterile surgical marker, an appropriate rhombic flap was drawn incorporating the defect. The area thus outlined was incised deep to adipose tissue with a #15 scalpel blade.  The skin margins were undermined to an appropriate distance in all directions utilizing iris scissors.
Spiral Flap Text: The defect edges were debeveled with a #15 scalpel blade.  Given the location of the defect, shape of the defect and the proximity to free margins a spiral flap was deemed most appropriate.  Using a sterile surgical marker, an appropriate rotation flap was drawn incorporating the defect and placing the expected incisions within the relaxed skin tension lines where possible. The area thus outlined was incised deep to adipose tissue with a #15 scalpel blade.  The skin margins were undermined to an appropriate distance in all directions utilizing iris scissors.
Home Suture Removal Text: Patient was provided a home suture removal kit and will remove their sutures at home.  If they have any questions or difficulties they will call the office.
Curvilinear Excision Additional Text (Leave Blank If You Do Not Want): The margin was drawn around the clinically apparent lesion.  A curvilinear shape was then drawn on the skin incorporating the lesion and margins.  Incisions were then made along these lines to the appropriate tissue plane and the lesion was extirpated.
Complex Repair And V-Y Plasty Text: The defect edges were debeveled with a #15 scalpel blade.  The primary defect was closed partially with a complex linear closure.  Given the location of the remaining defect, shape of the defect and the proximity to free margins a V-Y plasty was deemed most appropriate for complete closure of the defect.  Using a sterile surgical marker, an appropriate advancement flap was drawn incorporating the defect and placing the expected incisions within the relaxed skin tension lines where possible.    The area thus outlined was incised deep to adipose tissue with a #15 scalpel blade.  The skin margins were undermined to an appropriate distance in all directions utilizing iris scissors.
Lazy S Intermediate Repair Preamble Text (Leave Blank If You Do Not Want): Undermining was performed with blunt dissection.
Complex Repair And Modified Advancement Flap Text: The defect edges were debeveled with a #15 scalpel blade.  The primary defect was closed partially with a complex linear closure.  Given the location of the remaining defect, shape of the defect and the proximity to free margins a modified advancement flap was deemed most appropriate for complete closure of the defect.  Using a sterile surgical marker, an appropriate advancement flap was drawn incorporating the defect and placing the expected incisions within the relaxed skin tension lines where possible.    The area thus outlined was incised deep to adipose tissue with a #15 scalpel blade.  The skin margins were undermined to an appropriate distance in all directions utilizing iris scissors.
Island Pedicle Flap-Requiring Vessel Identification Text: The defect edges were debeveled with a #15 scalpel blade.  Given the location of the defect, shape of the defect and the proximity to free margins an island pedicle advancement flap was deemed most appropriate.  Using a sterile surgical marker, an appropriate advancement flap was drawn, based on the axial vessel mentioned above, incorporating the defect, outlining the appropriate donor tissue and placing the expected incisions within the relaxed skin tension lines where possible.    The area thus outlined was incised deep to adipose tissue with a #15 scalpel blade.  The skin margins were undermined to an appropriate distance in all directions around the primary defect and laterally outward around the island pedicle utilizing iris scissors.  There was minimal undermining beneath the pedicle flap.
Complex Repair And O-L Flap Text: The defect edges were debeveled with a #15 scalpel blade.  The primary defect was closed partially with a complex linear closure.  Given the location of the remaining defect, shape of the defect and the proximity to free margins an O-L flap was deemed most appropriate for complete closure of the defect.  Using a sterile surgical marker, an appropriate flap was drawn incorporating the defect and placing the expected incisions within the relaxed skin tension lines where possible.    The area thus outlined was incised deep to adipose tissue with a #15 scalpel blade.  The skin margins were undermined to an appropriate distance in all directions utilizing iris scissors.
Rotation Flap Text: The defect edges were debeveled with a #15 scalpel blade.  Given the location of the defect, shape of the defect and the proximity to free margins a rotation flap was deemed most appropriate.  Using a sterile surgical marker, an appropriate rotation flap was drawn incorporating the defect and placing the expected incisions within the relaxed skin tension lines where possible.    The area thus outlined was incised deep to adipose tissue with a #15 scalpel blade.  The skin margins were undermined to an appropriate distance in all directions utilizing iris scissors.
Star Wedge Flap Text: The defect edges were debeveled with a #15 scalpel blade.  Given the location of the defect, shape of the defect and the proximity to free margins a star wedge flap was deemed most appropriate.  Using a sterile surgical marker, an appropriate rotation flap was drawn incorporating the defect and placing the expected incisions within the relaxed skin tension lines where possible. The area thus outlined was incised deep to adipose tissue with a #15 scalpel blade.  The skin margins were undermined to an appropriate distance in all directions utilizing iris scissors.
Complex Repair And W Plasty Text: The defect edges were debeveled with a #15 scalpel blade.  The primary defect was closed partially with a complex linear closure.  Given the location of the remaining defect, shape of the defect and the proximity to free margins a W plasty was deemed most appropriate for complete closure of the defect.  Using a sterile surgical marker, an appropriate advancement flap was drawn incorporating the defect and placing the expected incisions within the relaxed skin tension lines where possible.    The area thus outlined was incised deep to adipose tissue with a #15 scalpel blade.  The skin margins were undermined to an appropriate distance in all directions utilizing iris scissors.
Repair Type: Complex
Detail Level: Detailed
Lazy S Complex Repair Preamble Text (Leave Blank If You Do Not Want): Extensive wide undermining was performed.
Melolabial Interpolation Flap Text: A decision was made to reconstruct the defect utilizing an interpolation axial flap and a staged reconstruction.  A telfa template was made of the defect.  This telfa template was then used to outline the melolabial interpolation flap.  The donor area for the pedicle flap was then injected with anesthesia.  The flap was excised through the skin and subcutaneous tissue down to the layer of the underlying musculature.  The pedicle flap was carefully excised within this deep plane to maintain its blood supply.  The edges of the donor site were undermined.   The donor site was closed in a primary fashion.  The pedicle was then rotated into position and sutured.  Once the tube was sutured into place, adequate blood supply was confirmed with blanching and refill.  The pedicle was then wrapped with xeroform gauze and dressed appropriately with a telfa and gauze bandage to ensure continued blood supply and protect the attached pedicle.
Xenograft Text: The defect edges were debeveled with a #15 scalpel blade.  Given the location of the defect, shape of the defect and the proximity to free margins a xenograft was deemed most appropriate.  The graft was then trimmed to fit the size of the defect.  The graft was then placed in the primary defect and oriented appropriately.
Saucerization Excision Additional Text (Leave Blank If You Do Not Want): The margin was drawn around the clinically apparent lesion.  Incisions were then made along these lines, in a tangential fashion, to the appropriate tissue plane and the lesion was extirpated.
Epidermal Sutures: 5-0 Surgipro
Anesthesia Volume In Cc: 6
Z Plasty Text: The lesion was extirpated to the level of the fat with a #15 scalpel blade.  Given the location of the defect, shape of the defect and the proximity to free margins a Z-plasty was deemed most appropriate for repair.  Using a sterile surgical marker, the appropriate transposition arms of the Z-plasty were drawn incorporating the defect and placing the expected incisions within the relaxed skin tension lines where possible.    The area thus outlined was incised deep to adipose tissue with a #15 scalpel blade.  The skin margins were undermined to an appropriate distance in all directions utilizing iris scissors.  The opposing transposition arms were then transposed into place in opposite direction and anchored with interrupted buried subcutaneous sutures.
Purse String (Simple) Text: Given the location of the defect and the characteristics of the surrounding skin a purse string simple closure was deemed most appropriate.  Undermining was performed circumferentially around the surgical defect.  A purse string suture was then placed and tightened.
Island Pedicle Flap With Canthal Suspension Text: The defect edges were debeveled with a #15 scalpel blade.  Given the location of the defect, shape of the defect and the proximity to free margins an island pedicle advancement flap was deemed most appropriate.  Using a sterile surgical marker, an appropriate advancement flap was drawn incorporating the defect, outlining the appropriate donor tissue and placing the expected incisions within the relaxed skin tension lines where possible. The area thus outlined was incised deep to adipose tissue with a #15 scalpel blade.  The skin margins were undermined to an appropriate distance in all directions around the primary defect and laterally outward around the island pedicle utilizing iris scissors.  There was minimal undermining beneath the pedicle flap. A suspension suture was placed in the canthal tendon to prevent tension and prevent ectropion.
Excision Depth: adipose tissue
Advancement Flap (Single) Text: The defect edges were debeveled with a #15 scalpel blade.  Given the location of the defect and the proximity to free margins a single advancement flap was deemed most appropriate.  Using a sterile surgical marker, an appropriate advancement flap was drawn incorporating the defect and placing the expected incisions within the relaxed skin tension lines where possible.    The area thus outlined was incised deep to adipose tissue with a #15 scalpel blade.  The skin margins were undermined to an appropriate distance in all directions utilizing iris scissors.
Melolabial Transposition Flap Text: The defect edges were debeveled with a #15 scalpel blade.  Given the location of the defect and the proximity to free margins a melolabial flap was deemed most appropriate.  Using a sterile surgical marker, an appropriate melolabial transposition flap was drawn incorporating the defect.    The area thus outlined was incised deep to adipose tissue with a #15 scalpel blade.  The skin margins were undermined to an appropriate distance in all directions utilizing iris scissors.
Complex Repair And Bilobe Flap Text: The defect edges were debeveled with a #15 scalpel blade.  The primary defect was closed partially with a complex linear closure.  Given the location of the remaining defect, shape of the defect and the proximity to free margins a bilobe flap was deemed most appropriate for complete closure of the defect.  Using a sterile surgical marker, an appropriate advancement flap was drawn incorporating the defect and placing the expected incisions within the relaxed skin tension lines where possible.    The area thus outlined was incised deep to adipose tissue with a #15 scalpel blade.  The skin margins were undermined to an appropriate distance in all directions utilizing iris scissors.
Slit Excision Additional Text (Leave Blank If You Do Not Want): A linear line was drawn on the skin overlying the lesion. An incision was made slowly until the lesion was visualized.  Once visualized, the lesion was removed with blunt dissection.
Cheek-To-Nose Interpolation Flap Text: A decision was made to reconstruct the defect utilizing an interpolation axial flap and a staged reconstruction.  A telfa template was made of the defect.  This telfa template was then used to outline the Cheek-To-Nose Interpolation flap.  The donor area for the pedicle flap was then injected with anesthesia.  The flap was excised through the skin and subcutaneous tissue down to the layer of the underlying musculature.  The interpolation flap was carefully excised within this deep plane to maintain its blood supply.  The edges of the donor site were undermined.   The donor site was closed in a primary fashion.  The pedicle was then rotated into position and sutured.  Once the tube was sutured into place, adequate blood supply was confirmed with blanching and refill.  The pedicle was then wrapped with xeroform gauze and dressed appropriately with a telfa and gauze bandage to ensure continued blood supply and protect the attached pedicle.
Rhombic Flap Text: The defect edges were debeveled with a #15 scalpel blade.  Given the location of the defect and the proximity to free margins a rhombic flap was deemed most appropriate.  Using a sterile surgical marker, an appropriate rhombic flap was drawn incorporating the defect.    The area thus outlined was incised deep to adipose tissue with a #15 scalpel blade.  The skin margins were undermined to an appropriate distance in all directions utilizing iris scissors.
Alar Island Pedicle Flap Text: The defect edges were debeveled with a #15 scalpel blade.  Given the location of the defect, shape of the defect and the proximity to the alar rim an island pedicle advancement flap was deemed most appropriate.  Using a sterile surgical marker, an appropriate advancement flap was drawn incorporating the defect, outlining the appropriate donor tissue and placing the expected incisions within the nasal ala running parallel to the alar rim. The area thus outlined was incised with a #15 scalpel blade.  The skin margins were undermined minimally to an appropriate distance in all directions around the primary defect and laterally outward around the island pedicle utilizing iris scissors.  There was minimal undermining beneath the pedicle flap.
Complex Repair And Double M Plasty Text: The defect edges were debeveled with a #15 scalpel blade.  The primary defect was closed partially with a complex linear closure.  Given the location of the remaining defect, shape of the defect and the proximity to free margins a double M plasty was deemed most appropriate for complete closure of the defect.  Using a sterile surgical marker, an appropriate advancement flap was drawn incorporating the defect and placing the expected incisions within the relaxed skin tension lines where possible.    The area thus outlined was incised deep to adipose tissue with a #15 scalpel blade.  The skin margins were undermined to an appropriate distance in all directions utilizing iris scissors.
Post-Care Instructions: I reviewed with the patient in detail post-care instructions. Patient is not to engage in any heavy lifting, exercise, or swimming for the next 14 days. Should the patient develop any fevers, chills, bleeding, severe pain patient will contact the office immediately.
Complex Repair And Single Advancement Flap Text: The defect edges were debeveled with a #15 scalpel blade.  The primary defect was closed partially with a complex linear closure.  Given the location of the remaining defect, shape of the defect and the proximity to free margins a single advancement flap was deemed most appropriate for complete closure of the defect.  Using a sterile surgical marker, an appropriate advancement flap was drawn incorporating the defect and placing the expected incisions within the relaxed skin tension lines where possible.    The area thus outlined was incised deep to adipose tissue with a #15 scalpel blade.  The skin margins were undermined to an appropriate distance in all directions utilizing iris scissors.
V-Y Plasty Text: The defect edges were debeveled with a #15 scalpel blade.  Given the location of the defect, shape of the defect and the proximity to free margins an V-Y advancement flap was deemed most appropriate.  Using a sterile surgical marker, an appropriate advancement flap was drawn incorporating the defect and placing the expected incisions within the relaxed skin tension lines where possible.    The area thus outlined was incised deep to adipose tissue with a #15 scalpel blade.  The skin margins were undermined to an appropriate distance in all directions utilizing iris scissors.
O-T Advancement Flap Text: The defect edges were debeveled with a #15 scalpel blade.  Given the location of the defect, shape of the defect and the proximity to free margins an O-T advancement flap was deemed most appropriate.  Using a sterile surgical marker, an appropriate advancement flap was drawn incorporating the defect and placing the expected incisions within the relaxed skin tension lines where possible.    The area thus outlined was incised deep to adipose tissue with a #15 scalpel blade.  The skin margins were undermined to an appropriate distance in all directions utilizing iris scissors.
Helical Rim Advancement Flap Text: The defect edges were debeveled with a #15 blade scalpel.  Given the location of the defect and the proximity to free margins (helical rim) a double helical rim advancement flap was deemed most appropriate.  Using a sterile surgical marker, the appropriate advancement flaps were drawn incorporating the defect and placing the expected incisions between the helical rim and antihelix where possible.  The area thus outlined was incised through and through with a #15 scalpel blade.  With a skin hook and iris scissors, the flaps were gently and sharply undermined and freed up.
Anesthesia Type: 1% lidocaine with epinephrine and a 1:10 solution of 8.4% sodium bicarbonate
Pre-Excision Curettage Text (Leave Blank If You Do Not Want): Prior to drawing the surgical margin the visible lesion was removed with electrodesiccation and curettage to clearly define the lesion size.
Billing Type: Third-Party Bill
Size Of Margin In Cm: 0.5
Transposition Flap Text: The defect edges were debeveled with a #15 scalpel blade.  Given the location of the defect and the proximity to free margins a transposition flap was deemed most appropriate.  Using a sterile surgical marker, an appropriate transposition flap was drawn incorporating the defect.    The area thus outlined was incised deep to adipose tissue with a #15 scalpel blade.  The skin margins were undermined to an appropriate distance in all directions utilizing iris scissors.
Path Notes (To The Dermatopathologist): Please check margins.
Modified Advancement Flap Text: The defect edges were debeveled with a #15 scalpel blade.  Given the location of the defect, shape of the defect and the proximity to free margins a modified advancement flap was deemed most appropriate.  Using a sterile surgical marker, an appropriate advancement flap was drawn incorporating the defect and placing the expected incisions within the relaxed skin tension lines where possible.    The area thus outlined was incised deep to adipose tissue with a #15 scalpel blade.  The skin margins were undermined to an appropriate distance in all directions utilizing iris scissors.
Composite Graft Text: The defect edges were debeveled with a #15 scalpel blade.  Given the location of the defect, shape of the defect, the proximity to free margins and the fact the defect was full thickness a composite graft was deemed most appropriate.  The defect was outline and then transferred to the donor site.  A full thickness graft was then excised from the donor site. The graft was then placed in the primary defect, oriented appropriately and then sutured into place.  The secondary defect was then repaired using a primary closure.
Advancement-Rotation Flap Text: The defect edges were debeveled with a #15 scalpel blade.  Given the location of the defect, shape of the defect and the proximity to free margins an advancement-rotation flap was deemed most appropriate.  Using a sterile surgical marker, an appropriate flap was drawn incorporating the defect and placing the expected incisions within the relaxed skin tension lines where possible. The area thus outlined was incised deep to adipose tissue with a #15 scalpel blade.  The skin margins were undermined to an appropriate distance in all directions utilizing iris scissors.
Fusiform Excision Additional Text (Leave Blank If You Do Not Want): The margin was drawn around the clinically apparent lesion.  A fusiform shape was then drawn on the skin incorporating the lesion and margins.  Incisions were then made along these lines to the appropriate tissue plane and the lesion was extirpated.
Complex Repair And Rotation Flap Text: The defect edges were debeveled with a #15 scalpel blade.  The primary defect was closed partially with a complex linear closure.  Given the location of the remaining defect, shape of the defect and the proximity to free margins a rotation flap was deemed most appropriate for complete closure of the defect.  Using a sterile surgical marker, an appropriate advancement flap was drawn incorporating the defect and placing the expected incisions within the relaxed skin tension lines where possible.    The area thus outlined was incised deep to adipose tissue with a #15 scalpel blade.  The skin margins were undermined to an appropriate distance in all directions utilizing iris scissors.
Positioning (Leave Blank If You Do Not Want): The patient was placed in a comfortable position exposing the surgical site.
Complex Repair And O-T Advancement Flap Text: The defect edges were debeveled with a #15 scalpel blade.  The primary defect was closed partially with a complex linear closure.  Given the location of the remaining defect, shape of the defect and the proximity to free margins an O-T advancement flap was deemed most appropriate for complete closure of the defect.  Using a sterile surgical marker, an appropriate advancement flap was drawn incorporating the defect and placing the expected incisions within the relaxed skin tension lines where possible.    The area thus outlined was incised deep to adipose tissue with a #15 scalpel blade.  The skin margins were undermined to an appropriate distance in all directions utilizing iris scissors.
Elliptical Excision Additional Text (Leave Blank If You Do Not Want): The margin was drawn around the clinically apparent lesion.  An elliptical shape was then drawn on the skin incorporating the lesion and margins.  Incisions were then made along these lines to the appropriate tissue plane and the lesion was extirpated.
Crescentic Advancement Flap Text: The defect edges were debeveled with a #15 scalpel blade.  Given the location of the defect and the proximity to free margins a crescentic advancement flap was deemed most appropriate.  Using a sterile surgical marker, the appropriate advancement flap was drawn incorporating the defect and placing the expected incisions within the relaxed skin tension lines where possible.    The area thus outlined was incised deep to adipose tissue with a #15 scalpel blade.  The skin margins were undermined to an appropriate distance in all directions utilizing iris scissors.
Complex Repair And Double Advancement Flap Text: The defect edges were debeveled with a #15 scalpel blade.  The primary defect was closed partially with a complex linear closure.  Given the location of the remaining defect, shape of the defect and the proximity to free margins a double advancement flap was deemed most appropriate for complete closure of the defect.  Using a sterile surgical marker, an appropriate advancement flap was drawn incorporating the defect and placing the expected incisions within the relaxed skin tension lines where possible.    The area thus outlined was incised deep to adipose tissue with a #15 scalpel blade.  The skin margins were undermined to an appropriate distance in all directions utilizing iris scissors.
Complex Repair And Rhombic Flap Text: The defect edges were debeveled with a #15 scalpel blade.  The primary defect was closed partially with a complex linear closure.  Given the location of the remaining defect, shape of the defect and the proximity to free margins a rhombic flap was deemed most appropriate for complete closure of the defect.  Using a sterile surgical marker, an appropriate advancement flap was drawn incorporating the defect and placing the expected incisions within the relaxed skin tension lines where possible.    The area thus outlined was incised deep to adipose tissue with a #15 scalpel blade.  The skin margins were undermined to an appropriate distance in all directions utilizing iris scissors.
Complex Repair And Ftsg Text: The defect edges were debeveled with a #15 scalpel blade.  The primary defect was closed partially with a complex linear closure.  Given the location of the defect, shape of the defect and the proximity to free margins a full thickness skin graft was deemed most appropriate to repair the remaining defect.  The graft was trimmed to fit the size of the remaining defect.  The graft was then placed in the primary defect, oriented appropriately, and sutured into place.
Hemostasis: Electrodesiccation
Mastoid Interpolation Flap Text: A decision was made to reconstruct the defect utilizing an interpolation axial flap and a staged reconstruction.  A telfa template was made of the defect.  This telfa template was then used to outline the mastoid interpolation flap.  The donor area for the pedicle flap was then injected with anesthesia.  The flap was excised through the skin and subcutaneous tissue down to the layer of the underlying musculature.  The pedicle flap was carefully excised within this deep plane to maintain its blood supply.  The edges of the donor site were undermined.   The donor site was closed in a primary fashion.  The pedicle was then rotated into position and sutured.  Once the tube was sutured into place, adequate blood supply was confirmed with blanching and refill.  The pedicle was then wrapped with xeroform gauze and dressed appropriately with a telfa and gauze bandage to ensure continued blood supply and protect the attached pedicle.
Scalpel Size: double edge Personna blade
Posterior Auricular Interpolation Flap Text: A decision was made to reconstruct the defect utilizing an interpolation axial flap and a staged reconstruction.  A telfa template was made of the defect.  This telfa template was then used to outline the posterior auricular interpolation flap.  The donor area for the pedicle flap was then injected with anesthesia.  The flap was excised through the skin and subcutaneous tissue down to the layer of the underlying musculature.  The pedicle flap was carefully excised within this deep plane to maintain its blood supply.  The edges of the donor site were undermined.   The donor site was closed in a primary fashion.  The pedicle was then rotated into position and sutured.  Once the tube was sutured into place, adequate blood supply was confirmed with blanching and refill.  The pedicle was then wrapped with xeroform gauze and dressed appropriately with a telfa and gauze bandage to ensure continued blood supply and protect the attached pedicle.
Information: Selecting Yes will display possible errors in your note based on the variables you have selected. This validation is only offered as a suggestion for you. PLEASE NOTE THAT THE VALIDATION TEXT WILL BE REMOVED WHEN YOU FINALIZE YOUR NOTE. IF YOU WANT TO FAX A PRELIMINARY NOTE YOU WILL NEED TO TOGGLE THIS TO 'NO' IF YOU DO NOT WANT IT IN YOUR FAXED NOTE.
Trilobed Flap Text: The defect edges were debeveled with a #15 scalpel blade.  Given the location of the defect and the proximity to free margins a trilobed flap was deemed most appropriate.  Using a sterile surgical marker, an appropriate trilobed flap drawn around the defect.    The area thus outlined was incised deep to adipose tissue with a #15 scalpel blade.  The skin margins were undermined to an appropriate distance in all directions utilizing iris scissors.
A-T Advancement Flap Text: The defect edges were debeveled with a #15 scalpel blade.  Given the location of the defect, shape of the defect and the proximity to free margins an A-T advancement flap was deemed most appropriate.  Using a sterile surgical marker, an appropriate advancement flap was drawn incorporating the defect and placing the expected incisions within the relaxed skin tension lines where possible.    The area thus outlined was incised deep to adipose tissue with a #15 scalpel blade.  The skin margins were undermined to an appropriate distance in all directions utilizing iris scissors.
Epidermal Closure: running
Bilateral Helical Rim Advancement Flap Text: The defect edges were debeveled with a #15 blade scalpel.  Given the location of the defect and the proximity to free margins (helical rim) a bilateral helical rim advancement flap was deemed most appropriate.  Using a sterile surgical marker, the appropriate advancement flaps were drawn incorporating the defect and placing the expected incisions between the helical rim and antihelix where possible.  The area thus outlined was incised through and through with a #15 scalpel blade.  With a skin hook and iris scissors, the flaps were gently and sharply undermined and freed up.
Epidermal Closure Graft Donor Site (Optional): simple interrupted
Perilesional Excision Additional Text (Leave Blank If You Do Not Want): The margin was drawn around the clinically apparent lesion. Incisions were then made along these lines to the appropriate tissue plane and the lesion was extirpated.
Excisional Biopsy Additional Text (Leave Blank If You Do Not Want): The margin was drawn around the clinically apparent lesion. An elliptical shape was then drawn on the skin incorporating the lesion and margins.  Incisions were then made along these lines to the appropriate tissue plane and the lesion was extirpated.
Anesthesia Type: 1% lidocaine with epinephrine
Complex Repair And Melolabial Flap Text: The defect edges were debeveled with a #15 scalpel blade.  The primary defect was closed partially with a complex linear closure.  Given the location of the remaining defect, shape of the defect and the proximity to free margins a melolabial flap was deemed most appropriate for complete closure of the defect.  Using a sterile surgical marker, an appropriate advancement flap was drawn incorporating the defect and placing the expected incisions within the relaxed skin tension lines where possible.    The area thus outlined was incised deep to adipose tissue with a #15 scalpel blade.  The skin margins were undermined to an appropriate distance in all directions utilizing iris scissors.
Interpolation Flap Text: A decision was made to reconstruct the defect utilizing an interpolation axial flap and a staged reconstruction.  A telfa template was made of the defect.  This telfa template was then used to outline the interpolation flap.  The donor area for the pedicle flap was then injected with anesthesia.  The flap was excised through the skin and subcutaneous tissue down to the layer of the underlying musculature.  The interpolation flap was carefully excised within this deep plane to maintain its blood supply.  The edges of the donor site were undermined.   The donor site was closed in a primary fashion.  The pedicle was then rotated into position and sutured.  Once the tube was sutured into place, adequate blood supply was confirmed with blanching and refill.  The pedicle was then wrapped with xeroform gauze and dressed appropriately with a telfa and gauze bandage to ensure continued blood supply and protect the attached pedicle.
Partial Purse String (Intermediate) Text: Given the location of the defect and the characteristics of the surrounding skin an intermediate purse string closure was deemed most appropriate.  Undermining was performed circumferentially around the surgical defect.  A purse string suture was then placed and tightened. Wound tension of the circular defect prevented complete closure of the wound.
Complex Repair And Z Plasty Text: The defect edges were debeveled with a #15 scalpel blade.  The primary defect was closed partially with a complex linear closure.  Given the location of the remaining defect, shape of the defect and the proximity to free margins a Z plasty was deemed most appropriate for complete closure of the defect.  Using a sterile surgical marker, an appropriate advancement flap was drawn incorporating the defect and placing the expected incisions within the relaxed skin tension lines where possible.    The area thus outlined was incised deep to adipose tissue with a #15 scalpel blade.  The skin margins were undermined to an appropriate distance in all directions utilizing iris scissors.
Consent was obtained from the patient. The risks and benefits to therapy were discussed in detail. Specifically, the risks of infection, scarring, bleeding, prolonged wound healing, incomplete removal, allergy to anesthesia, nerve injury and recurrence were addressed. Prior to the procedure, the treatment site was clearly identified and confirmed by the patient. All components of Universal Protocol/PAUSE Rule completed.
Complex Repair And Tissue Cultured Epidermal Autograft Text: The defect edges were debeveled with a #15 scalpel blade.  The primary defect was closed partially with a complex linear closure.  Given the location of the defect, shape of the defect and the proximity to free margins an tissue cultured epidermal autograft was deemed most appropriate to repair the remaining defect.  The graft was trimmed to fit the size of the remaining defect.  The graft was then placed in the primary defect, oriented appropriately, and sutured into place.
Excision Method: Elliptical
Complex Repair And Dermal Autograft Text: The defect edges were debeveled with a #15 scalpel blade.  The primary defect was closed partially with a complex linear closure.  Given the location of the defect, shape of the defect and the proximity to free margins an dermal autograft was deemed most appropriate to repair the remaining defect.  The graft was trimmed to fit the size of the remaining defect.  The graft was then placed in the primary defect, oriented appropriately, and sutured into place.
Ear Star Wedge Flap Text: The defect edges were debeveled with a #15 blade scalpel.  Given the location of the defect and the proximity to free margins (helical rim) an ear star wedge flap was deemed most appropriate.  Using a sterile surgical marker, the appropriate flap was drawn incorporating the defect and placing the expected incisions between the helical rim and antihelix where possible.  The area thus outlined was incised through and through with a #15 scalpel blade.
Purse String (Intermediate) Text: Given the location of the defect and the characteristics of the surrounding skin a purse string intermediate closure was deemed most appropriate.  Undermining was performed circumfirentially around the surgical defect.  A purse string suture was then placed and tightened.
Graft Donor Site Bandage (Optional-Leave Blank If You Don't Want In Note): Steri-strips and a pressure bandage were applied to the donor site.
Dressing: pressure dressing with telfa
Partial Purse String (Simple) Text: Given the location of the defect and the characteristics of the surrounding skin a simple purse string closure was deemed most appropriate.  Undermining was performed circumferentially around the surgical defect.  A purse string suture was then placed and tightened. Wound tension of the circular defect prevented complete closure of the wound.
Burow's Advancement Flap Text: The defect edges were debeveled with a #15 scalpel blade.  Given the location of the defect and the proximity to free margins a Burow's advancement flap was deemed most appropriate.  Using a sterile surgical marker, the appropriate advancement flap was drawn incorporating the defect and placing the expected incisions within the relaxed skin tension lines where possible.    The area thus outlined was incised deep to adipose tissue with a #15 scalpel blade.  The skin margins were undermined to an appropriate distance in all directions utilizing iris scissors.
Intermediate / Complex Repair - Final Wound Length In Cm: 5
Estimated Blood Loss (Cc): 3 cc
Deep Sutures: 5-0 Monocryl
Complex Repair And M Plasty Text: The defect edges were debeveled with a #15 scalpel blade.  The primary defect was closed partially with a complex linear closure.  Given the location of the remaining defect, shape of the defect and the proximity to free margins an M plasty was deemed most appropriate for complete closure of the defect.  Using a sterile surgical marker, an appropriate advancement flap was drawn incorporating the defect and placing the expected incisions within the relaxed skin tension lines where possible.    The area thus outlined was incised deep to adipose tissue with a #15 scalpel blade.  The skin margins were undermined to an appropriate distance in all directions utilizing iris scissors.
O-T Plasty Text: The defect edges were debeveled with a #15 scalpel blade.  Given the location of the defect, shape of the defect and the proximity to free margins an O-T plasty was deemed most appropriate.  Using a sterile surgical marker, an appropriate O-T plasty was drawn incorporating the defect and placing the expected incisions within the relaxed skin tension lines where possible.    The area thus outlined was incised deep to adipose tissue with a #15 scalpel blade.  The skin margins were undermined to an appropriate distance in all directions utilizing iris scissors.
S Plasty Text: Given the location and shape of the defect, and the orientation of relaxed skin tension lines, an S-plasty was deemed most appropriate for repair.  Using a sterile surgical marker, the appropriate outline of the S-plasty was drawn, incorporating the defect and placing the expected incisions within the relaxed skin tension lines where possible.  The area thus outlined was incised deep to adipose tissue with a #15 scalpel blade.  The skin margins were undermined to an appropriate distance in all directions utilizing iris scissors. The skin flaps were advanced over the defect.  The opposing margins were then approximated with interrupted buried subcutaneous sutures.
Complex Repair And A-T Advancement Flap Text: The defect edges were debeveled with a #15 scalpel blade.  The primary defect was closed partially with a complex linear closure.  Given the location of the remaining defect, shape of the defect and the proximity to free margins an A-T advancement flap was deemed most appropriate for complete closure of the defect.  Using a sterile surgical marker, an appropriate advancement flap was drawn incorporating the defect and placing the expected incisions within the relaxed skin tension lines where possible.    The area thus outlined was incised deep to adipose tissue with a #15 scalpel blade.  The skin margins were undermined to an appropriate distance in all directions utilizing iris scissors.
Complex Repair And Dorsal Nasal Flap Text: The defect edges were debeveled with a #15 scalpel blade.  The primary defect was closed partially with a complex linear closure.  Given the location of the remaining defect, shape of the defect and the proximity to free margins a dorsal nasal flap was deemed most appropriate for complete closure of the defect.  Using a sterile surgical marker, an appropriate flap was drawn incorporating the defect and placing the expected incisions within the relaxed skin tension lines where possible.    The area thus outlined was incised deep to adipose tissue with a #15 scalpel blade.  The skin margins were undermined to an appropriate distance in all directions utilizing iris scissors.
Complex Repair And Transposition Flap Text: The defect edges were debeveled with a #15 scalpel blade.  The primary defect was closed partially with a complex linear closure.  Given the location of the remaining defect, shape of the defect and the proximity to free margins a transposition flap was deemed most appropriate for complete closure of the defect.  Using a sterile surgical marker, an appropriate advancement flap was drawn incorporating the defect and placing the expected incisions within the relaxed skin tension lines where possible.    The area thus outlined was incised deep to adipose tissue with a #15 scalpel blade.  The skin margins were undermined to an appropriate distance in all directions utilizing iris scissors.
Repair Performed By Another Provider Text (Leave Blank If You Do Not Want): After the tissue was excised the defect was repaired by another provider.
V-Y Flap Text: The defect edges were debeveled with a #15 scalpel blade.  Given the location of the defect, shape of the defect and the proximity to free margins a V-Y flap was deemed most appropriate.  Using a sterile surgical marker, an appropriate advancement flap was drawn incorporating the defect and placing the expected incisions within the relaxed skin tension lines where possible.    The area thus outlined was incised deep to adipose tissue with a #15 scalpel blade.  The skin margins were undermined to an appropriate distance in all directions utilizing iris scissors.
H Plasty Text: Given the location of the defect, shape of the defect and the proximity to free margins a H-plasty was deemed most appropriate for repair.  Using a sterile surgical marker, the appropriate advancement arms of the H-plasty were drawn incorporating the defect and placing the expected incisions within the relaxed skin tension lines where possible. The area thus outlined was incised deep to adipose tissue with a #15 scalpel blade. The skin margins were undermined to an appropriate distance in all directions utilizing iris scissors.  The opposing advancement arms were then advanced into place in opposite direction and anchored with interrupted buried subcutaneous sutures.
Complex Repair And Xenograft Text: The defect edges were debeveled with a #15 scalpel blade.  The primary defect was closed partially with a complex linear closure.  Given the location of the defect, shape of the defect and the proximity to free margins a xenograft was deemed most appropriate to repair the remaining defect.  The graft was trimmed to fit the size of the remaining defect.  The graft was then placed in the primary defect, oriented appropriately, and sutured into place.
Epidermal Autograft Text: The defect edges were debeveled with a #15 scalpel blade.  Given the location of the defect, shape of the defect and the proximity to free margins an epidermal autograft was deemed most appropriate.  Using a sterile surgical marker, the primary defect shape was transferred to the donor site. The epidermal graft was then harvested.  The skin graft was then placed in the primary defect and oriented appropriately.
Split-Thickness Skin Graft Text: The defect edges were debeveled with a #15 scalpel blade.  Given the location of the defect, shape of the defect and the proximity to free margins a split thickness skin graft was deemed most appropriate.  Using a sterile surgical marker, the primary defect shape was transferred to the donor site. The split thickness graft was then harvested.  The skin graft was then placed in the primary defect and oriented appropriately.
Banner Transposition Flap Text: The defect edges were debeveled with a #15 scalpel blade.  Given the location of the defect and the proximity to free margins a Banner transposition flap was deemed most appropriate.  Using a sterile surgical marker, an appropriate flap drawn around the defect. The area thus outlined was incised deep to adipose tissue with a #15 scalpel blade.  The skin margins were undermined to an appropriate distance in all directions utilizing iris scissors.
Wound Care: Vaseline
Complex Repair And Split-Thickness Skin Graft Text: The defect edges were debeveled with a #15 scalpel blade.  The primary defect was closed partially with a complex linear closure.  Given the location of the defect, shape of the defect and the proximity to free margins a split thickness skin graft was deemed most appropriate to repair the remaining defect.  The graft was trimmed to fit the size of the remaining defect.  The graft was then placed in the primary defect, oriented appropriately, and sutured into place.
Body Location Override (Optional - Billing Will Still Be Based On Selected Body Map Location If Applicable): R ventral upper arm (proximal)
Bilobed Flap Text: The defect edges were debeveled with a #15 scalpel blade.  Given the location of the defect and the proximity to free margins a bilobe flap was deemed most appropriate.  Using a sterile surgical marker, an appropriate bilobe flap drawn around the defect.    The area thus outlined was incised deep to adipose tissue with a #15 scalpel blade.  The skin margins were undermined to an appropriate distance in all directions utilizing iris scissors.
W Plasty Text: The lesion was extirpated to the level of the fat with a #15 scalpel blade.  Given the location of the defect, shape of the defect and the proximity to free margins a W-plasty was deemed most appropriate for repair.  Using a sterile surgical marker, the appropriate transposition arms of the W-plasty were drawn incorporating the defect and placing the expected incisions within the relaxed skin tension lines where possible.    The area thus outlined was incised deep to adipose tissue with a #15 scalpel blade.  The skin margins were undermined to an appropriate distance in all directions utilizing iris scissors.  The opposing transposition arms were then transposed into place in opposite direction and anchored with interrupted buried subcutaneous sutures.
Undermining Location (Optional): in the superficial subcutaneous fat
Mercedes Flap Text: The defect edges were debeveled with a #15 scalpel blade.  Given the location of the defect, shape of the defect and the proximity to free margins a Mercedes flap was deemed most appropriate.  Using a sterile surgical marker, an appropriate advancement flap was drawn incorporating the defect and placing the expected incisions within the relaxed skin tension lines where possible. The area thus outlined was incised deep to adipose tissue with a #15 scalpel blade.  The skin margins were undermined to an appropriate distance in all directions utilizing iris scissors.
Hatchet Flap Text: The defect edges were debeveled with a #15 scalpel blade.  Given the location of the defect, shape of the defect and the proximity to free margins a hatchet flap was deemed most appropriate.  Using a sterile surgical marker, an appropriate hatchet flap was drawn incorporating the defect and placing the expected incisions within the relaxed skin tension lines where possible.    The area thus outlined was incised deep to adipose tissue with a #15 scalpel blade.  The skin margins were undermined to an appropriate distance in all directions utilizing iris scissors.
Dermal Autograft Text: The defect edges were debeveled with a #15 scalpel blade.  Given the location of the defect, shape of the defect and the proximity to free margins a dermal autograft was deemed most appropriate.  Using a sterile surgical marker, the primary defect shape was transferred to the donor site. The area thus outlined was incised deep to adipose tissue with a #15 scalpel blade.  The harvested graft was then trimmed of adipose and epidermal tissue until only dermis was left.  The skin graft was then placed in the primary defect and oriented appropriately.
Biopsy Photograph Reviewed: No (no photograph available)
Skin Substitute Text: The defect edges were debeveled with a #15 scalpel blade.  Given the location of the defect, shape of the defect and the proximity to free margins a skin substitute graft was deemed most appropriate.  The graft material was trimmed to fit the size of the defect. The graft was then placed in the primary defect and oriented appropriately.
Cheek Interpolation Flap Text: A decision was made to reconstruct the defect utilizing an interpolation axial flap and a staged reconstruction.  A telfa template was made of the defect.  This telfa template was then used to outline the Cheek Interpolation flap.  The donor area for the pedicle flap was then injected with anesthesia.  The flap was excised through the skin and subcutaneous tissue down to the layer of the underlying musculature.  The interpolation flap was carefully excised within this deep plane to maintain its blood supply.  The edges of the donor site were undermined.   The donor site was closed in a primary fashion.  The pedicle was then rotated into position and sutured.  Once the tube was sutured into place, adequate blood supply was confirmed with blanching and refill.  The pedicle was then wrapped with xeroform gauze and dressed appropriately with a telfa and gauze bandage to ensure continued blood supply and protect the attached pedicle.
Cartilage Graft Text: The defect edges were debeveled with a #15 scalpel blade.  Given the location of the defect, shape of the defect, the fact the defect involved a full thickness cartilage defect a cartilage graft was deemed most appropriate.  An appropriate donor site was identified, cleansed, and anesthetized. The cartilage graft was then harvested and transferred to the recipient site, oriented appropriately and then sutured into place.  The secondary defect was then repaired using a primary closure.
Tissue Cultured Epidermal Autograft Text: The defect edges were debeveled with a #15 scalpel blade.  Given the location of the defect, shape of the defect and the proximity to free margins a tissue cultured epidermal autograft was deemed most appropriate.  The graft was then trimmed to fit the size of the defect.  The graft was then placed in the primary defect and oriented appropriately.
Bilobed Transposition Flap Text: The defect edges were debeveled with a #15 scalpel blade.  Given the location of the defect and the proximity to free margins a bilobed transposition flap was deemed most appropriate.  Using a sterile surgical marker, an appropriate bilobe flap drawn around the defect.    The area thus outlined was incised deep to adipose tissue with a #15 scalpel blade.  The skin margins were undermined to an appropriate distance in all directions utilizing iris scissors.
Keystone Flap Text: The defect edges were debeveled with a #15 scalpel blade.  Given the location of the defect, shape of the defect a keystone flap was deemed most appropriate.  Using a sterile surgical marker, an appropriate keystone flap was drawn incorporating the defect, outlining the appropriate donor tissue and placing the expected incisions within the relaxed skin tension lines where possible. The area thus outlined was incised deep to adipose tissue with a #15 scalpel blade.  The skin margins were undermined to an appropriate distance in all directions around the primary defect and laterally outward around the flap utilizing iris scissors.
Ftsg Text: The defect edges were debeveled with a #15 scalpel blade.  Given the location of the defect, shape of the defect and the proximity to free margins a full thickness skin graft was deemed most appropriate.  Using a sterile surgical marker, the primary defect shape was transferred to the donor site. The area thus outlined was incised deep to adipose tissue with a #15 scalpel blade.  The harvested graft was then trimmed of adipose tissue until only dermis and epidermis was left.  The skin margins of the secondary defect were undermined to an appropriate distance in all directions utilizing iris scissors.  The secondary defect was closed with interrupted buried subcutaneous sutures.  The skin edges were then re-apposed with running  sutures.  The skin graft was then placed in the primary defect and oriented appropriately.
Advancement Flap (Double) Text: The defect edges were debeveled with a #15 scalpel blade.  Given the location of the defect and the proximity to free margins a double advancement flap was deemed most appropriate.  Using a sterile surgical marker, the appropriate advancement flaps were drawn incorporating the defect and placing the expected incisions within the relaxed skin tension lines where possible.    The area thus outlined was incised deep to adipose tissue with a #15 scalpel blade.  The skin margins were undermined to an appropriate distance in all directions utilizing iris scissors.
No Repair - Repaired With Adjacent Surgical Defect Text (Leave Blank If You Do Not Want): After the excision the defect was repaired concurrently with another surgical defect which was in close approximation.
Suture Removal: 14 days

## 2018-12-31 ENCOUNTER — APPOINTMENT (OUTPATIENT)
Age: 78
Setting detail: DERMATOLOGY
End: 2018-12-31

## 2018-12-31 DIAGNOSIS — Z48.02 ENCOUNTER FOR REMOVAL OF SUTURES: ICD-10-CM

## 2018-12-31 DIAGNOSIS — L98419 CHRONIC ULCER OF OTHER SPECIFIED SITES: ICD-10-CM

## 2018-12-31 DIAGNOSIS — L98429 CHRONIC ULCER OF OTHER SPECIFIED SITES: ICD-10-CM

## 2018-12-31 PROBLEM — L98.499 NON-PRESSURE CHRONIC ULCER OF SKIN OF OTHER SITES WITH UNSPECIFIED SEVERITY: Status: ACTIVE | Noted: 2018-12-31

## 2018-12-31 PROCEDURE — OTHER SUTURE REMOVAL (NO GLOBAL PERIOD): OTHER

## 2018-12-31 PROCEDURE — OTHER POST-OP WOUND EVALUATION: OTHER

## 2018-12-31 PROCEDURE — 99212 OFFICE O/P EST SF 10 MIN: CPT

## 2018-12-31 PROCEDURE — OTHER DIAGNOSIS COMMENT: OTHER

## 2018-12-31 PROCEDURE — OTHER COUNSELING: OTHER

## 2018-12-31 PROCEDURE — OTHER MIPS QUALITY: OTHER

## 2018-12-31 ASSESSMENT — LOCATION ZONE DERM: LOCATION ZONE: ARM

## 2018-12-31 ASSESSMENT — LOCATION DETAILED DESCRIPTION DERM: LOCATION DETAILED: RIGHT PROXIMAL POSTERIOR UPPER ARM

## 2018-12-31 ASSESSMENT — LOCATION SIMPLE DESCRIPTION DERM: LOCATION SIMPLE: RIGHT POSTERIOR UPPER ARM

## 2018-12-31 NOTE — PROCEDURE: POST-OP WOUND EVALUATION
Add 06638 Cpt? (Important Note: In 2017 The Use Of 43999 Is Being Tracked By Cms To Determine Future Global Period Reimbursement For Global Periods): no
Wound Edema?: mild
Wound Crusting?: crusted
Follow Up Time Frame (Optional): days
Detail Level: Detailed
Wound Color?: pink
Wound Evaluated By (Optional): Barbie Anand, MPhil, MD
Wound Diameter In Cm(Optional): 0.4
Lymphadenopathy?: absent
Wound Discharge?: minimal discharge
Wound Granulation?: early
Wound Drainage?: serous drainage

## 2018-12-31 NOTE — PROCEDURE: MIPS QUALITY
Quality 226: Preventive Care And Screening: Tobacco Use: Screening And Cessation Intervention: Patient screened for tobacco and never smoked
Quality 131: Pain Assessment And Follow-Up: Pain assessment using a standardized tool is documented as negative, no follow-up plan required
Detail Level: Detailed
Quality 110: Preventive Care And Screening: Influenza Immunization: Influenza Immunization previously received during influenza season
Quality 431: Preventive Care And Screening: Unhealthy Alcohol Use - Screening: Patient screened for unhealthy alcohol use using a single question and scores less than 2 times per year

## 2019-01-01 ENCOUNTER — APPOINTMENT (OUTPATIENT)
Dept: ULTRASOUND IMAGING | Facility: CLINIC | Age: 79
DRG: 433 | End: 2019-01-01
Payer: MEDICARE

## 2019-01-01 ENCOUNTER — APPOINTMENT (OUTPATIENT)
Dept: PHYSICAL THERAPY | Facility: CLINIC | Age: 79
DRG: 433 | End: 2019-01-01
Payer: MEDICARE

## 2019-01-01 ENCOUNTER — APPOINTMENT (OUTPATIENT)
Dept: GENERAL RADIOLOGY | Facility: CLINIC | Age: 79
DRG: 433 | End: 2019-01-01
Payer: MEDICARE

## 2019-01-01 ENCOUNTER — HOSPITAL ENCOUNTER (INPATIENT)
Facility: CLINIC | Age: 79
LOS: 5 days | Discharge: NURSING, REHAB OR LONG-TERM CARE | DRG: 433 | End: 2019-02-02
Attending: EMERGENCY MEDICINE | Admitting: STUDENT IN AN ORGANIZED HEALTH CARE EDUCATION/TRAINING PROGRAM
Payer: MEDICARE

## 2019-01-01 ENCOUNTER — APPOINTMENT (OUTPATIENT)
Dept: CT IMAGING | Facility: CLINIC | Age: 79
DRG: 433 | End: 2019-01-01
Payer: MEDICARE

## 2019-01-01 ENCOUNTER — DOCUMENTATION ONLY (OUTPATIENT)
Dept: OTHER | Facility: CLINIC | Age: 79
End: 2019-01-01

## 2019-01-01 VITALS
OXYGEN SATURATION: 95 % | RESPIRATION RATE: 16 BRPM | WEIGHT: 126.3 LBS | DIASTOLIC BLOOD PRESSURE: 67 MMHG | HEART RATE: 75 BPM | HEIGHT: 67 IN | SYSTOLIC BLOOD PRESSURE: 113 MMHG | TEMPERATURE: 97.7 F | BODY MASS INDEX: 19.82 KG/M2

## 2019-01-01 DIAGNOSIS — R10.84 ABDOMINAL PAIN, GENERALIZED: ICD-10-CM

## 2019-01-01 DIAGNOSIS — R09.02 HYPOXEMIA: ICD-10-CM

## 2019-01-01 DIAGNOSIS — M62.81 GENERALIZED MUSCLE WEAKNESS: ICD-10-CM

## 2019-01-01 DIAGNOSIS — N39.0 URINARY TRACT INFECTION WITHOUT HEMATURIA, SITE UNSPECIFIED: ICD-10-CM

## 2019-01-01 DIAGNOSIS — R18.8 OTHER ASCITES: ICD-10-CM

## 2019-01-01 LAB
ALBUMIN FLD-MCNC: 1.3 G/DL
ALBUMIN SERPL-MCNC: 2.8 G/DL (ref 3.4–5)
ALBUMIN SERPL-MCNC: 3.3 G/DL (ref 3.4–5)
ALBUMIN UR-MCNC: 10 MG/DL
ALP SERPL-CCNC: 76 U/L (ref 40–150)
ALT SERPL W P-5'-P-CCNC: 15 U/L (ref 0–50)
AMMONIA PLAS-SCNC: 13 UMOL/L (ref 10–50)
ANION GAP SERPL CALCULATED.3IONS-SCNC: 10 MMOL/L (ref 3–14)
ANION GAP SERPL CALCULATED.3IONS-SCNC: 7 MMOL/L (ref 3–14)
APPEARANCE FLD: CLEAR
APPEARANCE UR: CLEAR
APTT PPP: 35 SEC (ref 22–37)
AST SERPL W P-5'-P-CCNC: 16 U/L (ref 0–45)
BACTERIA #/AREA URNS HPF: ABNORMAL /HPF
BACTERIA SPEC CULT: ABNORMAL
BACTERIA SPEC CULT: ABNORMAL
BACTERIA SPEC CULT: NO GROWTH
BASOPHILS # BLD AUTO: 0 10E9/L (ref 0–0.2)
BASOPHILS NFR BLD AUTO: 0.2 %
BILIRUB DIRECT SERPL-MCNC: 0.4 MG/DL (ref 0–0.2)
BILIRUB SERPL-MCNC: 1.1 MG/DL (ref 0.2–1.3)
BILIRUB UR QL STRIP: NEGATIVE
BUN SERPL-MCNC: 12 MG/DL (ref 7–30)
BUN SERPL-MCNC: 15 MG/DL (ref 7–30)
C DIFF TOX B STL QL: NEGATIVE
CALCIUM SERPL-MCNC: 8.1 MG/DL (ref 8.5–10.1)
CALCIUM SERPL-MCNC: 8.7 MG/DL (ref 8.5–10.1)
CHLORIDE SERPL-SCNC: 105 MMOL/L (ref 94–109)
CHLORIDE SERPL-SCNC: 112 MMOL/L (ref 94–109)
CO2 SERPL-SCNC: 21 MMOL/L (ref 20–32)
CO2 SERPL-SCNC: 21 MMOL/L (ref 20–32)
COLOR FLD: YELLOW
COLOR UR AUTO: YELLOW
COPATH REPORT: NORMAL
CREAT SERPL-MCNC: 0.69 MG/DL (ref 0.52–1.04)
CREAT SERPL-MCNC: 0.72 MG/DL (ref 0.52–1.04)
DIFFERENTIAL METHOD BLD: ABNORMAL
EOSINOPHIL # BLD AUTO: 0 10E9/L (ref 0–0.7)
EOSINOPHIL NFR BLD AUTO: 0.2 %
ERYTHROCYTE [DISTWIDTH] IN BLOOD BY AUTOMATED COUNT: 15.9 % (ref 10–15)
ERYTHROCYTE [DISTWIDTH] IN BLOOD BY AUTOMATED COUNT: 16.1 % (ref 10–15)
GFR SERPL CREATININE-BSD FRML MDRD: 81 ML/MIN/{1.73_M2}
GFR SERPL CREATININE-BSD FRML MDRD: 84 ML/MIN/{1.73_M2}
GLUCOSE BLDC GLUCOMTR-MCNC: 114 MG/DL (ref 70–99)
GLUCOSE SERPL-MCNC: 89 MG/DL (ref 70–99)
GLUCOSE SERPL-MCNC: 96 MG/DL (ref 70–99)
GLUCOSE UR STRIP-MCNC: NEGATIVE MG/DL
GRAM STN SPEC: NORMAL
HCT VFR BLD AUTO: 33.1 % (ref 35–47)
HCT VFR BLD AUTO: 33.3 % (ref 35–47)
HGB BLD-MCNC: 10.6 G/DL (ref 11.7–15.7)
HGB BLD-MCNC: 10.8 G/DL (ref 11.7–15.7)
HGB UR QL STRIP: NEGATIVE
IMM GRANULOCYTES # BLD: 0 10E9/L (ref 0–0.4)
IMM GRANULOCYTES NFR BLD: 0.2 %
INR PPP: 1.39 (ref 0.86–1.14)
KETONES UR STRIP-MCNC: 10 MG/DL
LACTATE BLD-SCNC: 1.3 MMOL/L (ref 0.7–2)
LEUKOCYTE ESTERASE UR QL STRIP: ABNORMAL
LIPASE SERPL-CCNC: 91 U/L (ref 73–393)
LYMPHOCYTES # BLD AUTO: 0.3 10E9/L (ref 0.8–5.3)
LYMPHOCYTES NFR BLD AUTO: 4.8 %
LYMPHOCYTES NFR FLD MANUAL: 54 %
Lab: ABNORMAL
MCH RBC QN AUTO: 28.3 PG (ref 26.5–33)
MCH RBC QN AUTO: 28.3 PG (ref 26.5–33)
MCHC RBC AUTO-ENTMCNC: 32 G/DL (ref 31.5–36.5)
MCHC RBC AUTO-ENTMCNC: 32.4 G/DL (ref 31.5–36.5)
MCV RBC AUTO: 87 FL (ref 78–100)
MCV RBC AUTO: 88 FL (ref 78–100)
MONOCYTES # BLD AUTO: 0.3 10E9/L (ref 0–1.3)
MONOCYTES NFR BLD AUTO: 4.4 %
MONOS+MACROS NFR FLD MANUAL: 17 %
MUCOUS THREADS #/AREA URNS LPF: PRESENT /LPF
NEUTROPHILS # BLD AUTO: 5.9 10E9/L (ref 1.6–8.3)
NEUTROPHILS NFR BLD AUTO: 90.2 %
NEUTS BAND NFR FLD MANUAL: 17 %
NITRATE UR QL: POSITIVE
NRBC # BLD AUTO: 0 10*3/UL
NRBC BLD AUTO-RTO: 0 /100
OTHER CELLS FLD MANUAL: 12 %
PH UR STRIP: 5.5 PH (ref 5–7)
PLATELET # BLD AUTO: 102 10E9/L (ref 150–450)
PLATELET # BLD AUTO: 74 10E9/L (ref 150–450)
POTASSIUM SERPL-SCNC: 3.3 MMOL/L (ref 3.4–5.3)
POTASSIUM SERPL-SCNC: 3.5 MMOL/L (ref 3.4–5.3)
POTASSIUM SERPL-SCNC: 4.2 MMOL/L (ref 3.4–5.3)
PROT FLD-MCNC: 2.1 G/DL
PROT SERPL-MCNC: 6.8 G/DL (ref 6.8–8.8)
RBC # BLD AUTO: 3.75 10E12/L (ref 3.8–5.2)
RBC # BLD AUTO: 3.82 10E12/L (ref 3.8–5.2)
RBC #/AREA URNS AUTO: 3 /HPF (ref 0–2)
SODIUM SERPL-SCNC: 136 MMOL/L (ref 133–144)
SODIUM SERPL-SCNC: 140 MMOL/L (ref 133–144)
SOURCE: ABNORMAL
SP GR UR STRIP: 1.01 (ref 1–1.03)
SPECIMEN SOURCE FLD: NORMAL
SPECIMEN SOURCE: ABNORMAL
SPECIMEN SOURCE: NORMAL
SQUAMOUS #/AREA URNS AUTO: <1 /HPF (ref 0–1)
UROBILINOGEN UR STRIP-MCNC: NORMAL MG/DL (ref 0–2)
WBC # BLD AUTO: 4.8 10E9/L (ref 4–11)
WBC # BLD AUTO: 6.5 10E9/L (ref 4–11)
WBC # FLD AUTO: NORMAL /UL
WBC #/AREA URNS AUTO: 29 /HPF (ref 0–5)

## 2019-01-01 PROCEDURE — A9270 NON-COVERED ITEM OR SERVICE: HCPCS | Mod: GY | Performed by: STUDENT IN AN ORGANIZED HEALTH CARE EDUCATION/TRAINING PROGRAM

## 2019-01-01 PROCEDURE — 25000128 H RX IP 250 OP 636: Performed by: EMERGENCY MEDICINE

## 2019-01-01 PROCEDURE — 25000132 ZZH RX MED GY IP 250 OP 250 PS 637: Mod: GY | Performed by: STUDENT IN AN ORGANIZED HEALTH CARE EDUCATION/TRAINING PROGRAM

## 2019-01-01 PROCEDURE — 99231 SBSQ HOSP IP/OBS SF/LOW 25: CPT | Performed by: INTERNAL MEDICINE

## 2019-01-01 PROCEDURE — 80076 HEPATIC FUNCTION PANEL: CPT | Performed by: EMERGENCY MEDICINE

## 2019-01-01 PROCEDURE — 88112 CYTOPATH CELL ENHANCE TECH: CPT | Performed by: STUDENT IN AN ORGANIZED HEALTH CARE EDUCATION/TRAINING PROGRAM

## 2019-01-01 PROCEDURE — 99207 ZZC CDG-MDM COMPONENT: MEETS LOW - DOWN CODED: CPT | Performed by: INTERNAL MEDICINE

## 2019-01-01 PROCEDURE — 85027 COMPLETE CBC AUTOMATED: CPT | Performed by: STUDENT IN AN ORGANIZED HEALTH CARE EDUCATION/TRAINING PROGRAM

## 2019-01-01 PROCEDURE — 97116 GAIT TRAINING THERAPY: CPT | Mod: GP

## 2019-01-01 PROCEDURE — 87186 SC STD MICRODIL/AGAR DIL: CPT | Performed by: EMERGENCY MEDICINE

## 2019-01-01 PROCEDURE — 12000000 ZZH R&B MED SURG/OB

## 2019-01-01 PROCEDURE — 87015 SPECIMEN INFECT AGNT CONCNTJ: CPT | Performed by: STUDENT IN AN ORGANIZED HEALTH CARE EDUCATION/TRAINING PROGRAM

## 2019-01-01 PROCEDURE — 25000132 ZZH RX MED GY IP 250 OP 250 PS 637: Mod: GY | Performed by: INTERNAL MEDICINE

## 2019-01-01 PROCEDURE — 87086 URINE CULTURE/COLONY COUNT: CPT | Performed by: EMERGENCY MEDICINE

## 2019-01-01 PROCEDURE — 87070 CULTURE OTHR SPECIMN AEROBIC: CPT | Performed by: STUDENT IN AN ORGANIZED HEALTH CARE EDUCATION/TRAINING PROGRAM

## 2019-01-01 PROCEDURE — 85730 THROMBOPLASTIN TIME PARTIAL: CPT | Performed by: EMERGENCY MEDICINE

## 2019-01-01 PROCEDURE — 84132 ASSAY OF SERUM POTASSIUM: CPT | Performed by: INTERNAL MEDICINE

## 2019-01-01 PROCEDURE — 83605 ASSAY OF LACTIC ACID: CPT | Performed by: EMERGENCY MEDICINE

## 2019-01-01 PROCEDURE — A9270 NON-COVERED ITEM OR SERVICE: HCPCS | Mod: GY | Performed by: INTERNAL MEDICINE

## 2019-01-01 PROCEDURE — 89051 BODY FLUID CELL COUNT: CPT | Performed by: STUDENT IN AN ORGANIZED HEALTH CARE EDUCATION/TRAINING PROGRAM

## 2019-01-01 PROCEDURE — 80048 BASIC METABOLIC PNL TOTAL CA: CPT | Performed by: STUDENT IN AN ORGANIZED HEALTH CARE EDUCATION/TRAINING PROGRAM

## 2019-01-01 PROCEDURE — 97530 THERAPEUTIC ACTIVITIES: CPT | Mod: GP | Performed by: PHYSICAL THERAPIST

## 2019-01-01 PROCEDURE — 82040 ASSAY OF SERUM ALBUMIN: CPT | Performed by: STUDENT IN AN ORGANIZED HEALTH CARE EDUCATION/TRAINING PROGRAM

## 2019-01-01 PROCEDURE — 88305 TISSUE EXAM BY PATHOLOGIST: CPT | Mod: 26 | Performed by: STUDENT IN AN ORGANIZED HEALTH CARE EDUCATION/TRAINING PROGRAM

## 2019-01-01 PROCEDURE — 81001 URINALYSIS AUTO W/SCOPE: CPT | Performed by: EMERGENCY MEDICINE

## 2019-01-01 PROCEDURE — 99285 EMERGENCY DEPT VISIT HI MDM: CPT | Mod: 25

## 2019-01-01 PROCEDURE — 74176 CT ABD & PELVIS W/O CONTRAST: CPT

## 2019-01-01 PROCEDURE — 97116 GAIT TRAINING THERAPY: CPT | Mod: GP | Performed by: PHYSICAL THERAPY ASSISTANT

## 2019-01-01 PROCEDURE — 85025 COMPLETE CBC W/AUTO DIFF WBC: CPT | Performed by: EMERGENCY MEDICINE

## 2019-01-01 PROCEDURE — 25000125 ZZHC RX 250: Performed by: RADIOLOGY

## 2019-01-01 PROCEDURE — 99221 1ST HOSP IP/OBS SF/LOW 40: CPT | Performed by: SURGERY

## 2019-01-01 PROCEDURE — 36415 COLL VENOUS BLD VENIPUNCTURE: CPT | Performed by: STUDENT IN AN ORGANIZED HEALTH CARE EDUCATION/TRAINING PROGRAM

## 2019-01-01 PROCEDURE — 36415 COLL VENOUS BLD VENIPUNCTURE: CPT | Performed by: INTERNAL MEDICINE

## 2019-01-01 PROCEDURE — 97116 GAIT TRAINING THERAPY: CPT | Mod: GP | Performed by: PHYSICAL THERAPIST

## 2019-01-01 PROCEDURE — 71046 X-RAY EXAM CHEST 2 VIEWS: CPT

## 2019-01-01 PROCEDURE — 40000193 ZZH STATISTIC PT WARD VISIT

## 2019-01-01 PROCEDURE — 88112 CYTOPATH CELL ENHANCE TECH: CPT | Mod: 26 | Performed by: STUDENT IN AN ORGANIZED HEALTH CARE EDUCATION/TRAINING PROGRAM

## 2019-01-01 PROCEDURE — 40000193 ZZH STATISTIC PT WARD VISIT: Performed by: PHYSICAL THERAPY ASSISTANT

## 2019-01-01 PROCEDURE — 99223 1ST HOSP IP/OBS HIGH 75: CPT | Mod: AI | Performed by: STUDENT IN AN ORGANIZED HEALTH CARE EDUCATION/TRAINING PROGRAM

## 2019-01-01 PROCEDURE — 85610 PROTHROMBIN TIME: CPT | Performed by: EMERGENCY MEDICINE

## 2019-01-01 PROCEDURE — 96375 TX/PRO/DX INJ NEW DRUG ADDON: CPT

## 2019-01-01 PROCEDURE — 99239 HOSP IP/OBS DSCHRG MGMT >30: CPT | Performed by: INTERNAL MEDICINE

## 2019-01-01 PROCEDURE — 40000193 ZZH STATISTIC PT WARD VISIT: Performed by: PHYSICAL THERAPIST

## 2019-01-01 PROCEDURE — 87205 SMEAR GRAM STAIN: CPT | Performed by: STUDENT IN AN ORGANIZED HEALTH CARE EDUCATION/TRAINING PROGRAM

## 2019-01-01 PROCEDURE — 97161 PT EVAL LOW COMPLEX 20 MIN: CPT | Mod: GP | Performed by: PHYSICAL THERAPIST

## 2019-01-01 PROCEDURE — 27210190 US PARACENTESIS

## 2019-01-01 PROCEDURE — 40000863 ZZH STATISTIC RADIOLOGY XRAY, US, CT, MAR, NM

## 2019-01-01 PROCEDURE — 00000155 ZZHCL STATISTIC H-CELL BLOCK W/STAIN: Performed by: STUDENT IN AN ORGANIZED HEALTH CARE EDUCATION/TRAINING PROGRAM

## 2019-01-01 PROCEDURE — 82042 OTHER SOURCE ALBUMIN QUAN EA: CPT | Performed by: STUDENT IN AN ORGANIZED HEALTH CARE EDUCATION/TRAINING PROGRAM

## 2019-01-01 PROCEDURE — 87493 C DIFF AMPLIFIED PROBE: CPT | Performed by: INTERNAL MEDICINE

## 2019-01-01 PROCEDURE — 88305 TISSUE EXAM BY PATHOLOGIST: CPT | Performed by: STUDENT IN AN ORGANIZED HEALTH CARE EDUCATION/TRAINING PROGRAM

## 2019-01-01 PROCEDURE — 0W9G3ZZ DRAINAGE OF PERITONEAL CAVITY, PERCUTANEOUS APPROACH: ICD-10-PCS | Performed by: RADIOLOGY

## 2019-01-01 PROCEDURE — 00000102 ZZHCL STATISTIC CYTO WRIGHT STAIN TC: Performed by: STUDENT IN AN ORGANIZED HEALTH CARE EDUCATION/TRAINING PROGRAM

## 2019-01-01 PROCEDURE — 83690 ASSAY OF LIPASE: CPT | Performed by: EMERGENCY MEDICINE

## 2019-01-01 PROCEDURE — 87088 URINE BACTERIA CULTURE: CPT | Performed by: EMERGENCY MEDICINE

## 2019-01-01 PROCEDURE — 25000128 H RX IP 250 OP 636: Performed by: STUDENT IN AN ORGANIZED HEALTH CARE EDUCATION/TRAINING PROGRAM

## 2019-01-01 PROCEDURE — 84157 ASSAY OF PROTEIN OTHER: CPT | Performed by: STUDENT IN AN ORGANIZED HEALTH CARE EDUCATION/TRAINING PROGRAM

## 2019-01-01 PROCEDURE — 82140 ASSAY OF AMMONIA: CPT | Performed by: EMERGENCY MEDICINE

## 2019-01-01 PROCEDURE — 96361 HYDRATE IV INFUSION ADD-ON: CPT

## 2019-01-01 PROCEDURE — 99207 ZZC APP CREDIT; MD BILLING SHARED VISIT: CPT | Performed by: INTERNAL MEDICINE

## 2019-01-01 PROCEDURE — 96365 THER/PROPH/DIAG IV INF INIT: CPT

## 2019-01-01 PROCEDURE — 80048 BASIC METABOLIC PNL TOTAL CA: CPT | Performed by: EMERGENCY MEDICINE

## 2019-01-01 PROCEDURE — 00000146 ZZHCL STATISTIC GLUCOSE BY METER IP

## 2019-01-01 RX ORDER — POTASSIUM CHLORIDE 1500 MG/1
40 TABLET, EXTENDED RELEASE ORAL EVERY 4 HOURS
Status: COMPLETED | OUTPATIENT
Start: 2019-01-01 | End: 2019-01-01

## 2019-01-01 RX ORDER — MORPHINE SULFATE 4 MG/ML
4 INJECTION, SOLUTION INTRAMUSCULAR; INTRAVENOUS
Status: DISCONTINUED | OUTPATIENT
Start: 2019-01-01 | End: 2019-01-01

## 2019-01-01 RX ORDER — ONDANSETRON 2 MG/ML
4 INJECTION INTRAMUSCULAR; INTRAVENOUS EVERY 30 MIN PRN
Status: DISCONTINUED | OUTPATIENT
Start: 2019-01-01 | End: 2019-01-01

## 2019-01-01 RX ORDER — PANTOPRAZOLE SODIUM 40 MG/1
40 TABLET, DELAYED RELEASE ORAL DAILY
Status: DISCONTINUED | OUTPATIENT
Start: 2019-01-01 | End: 2019-01-01 | Stop reason: HOSPADM

## 2019-01-01 RX ORDER — FUROSEMIDE 40 MG
40 TABLET ORAL DAILY
Status: ON HOLD | COMMUNITY
End: 2019-01-01

## 2019-01-01 RX ORDER — SERTRALINE HYDROCHLORIDE 100 MG/1
100 TABLET, FILM COATED ORAL DAILY
Status: DISCONTINUED | OUTPATIENT
Start: 2019-01-01 | End: 2019-01-01 | Stop reason: HOSPADM

## 2019-01-01 RX ORDER — AMLODIPINE BESYLATE 5 MG/1
5 TABLET ORAL DAILY
Status: DISCONTINUED | OUTPATIENT
Start: 2019-01-01 | End: 2019-01-01 | Stop reason: HOSPADM

## 2019-01-01 RX ORDER — SPIRONOLACTONE 50 MG/1
50 TABLET, FILM COATED ORAL DAILY
Status: ON HOLD | COMMUNITY
End: 2019-01-01

## 2019-01-01 RX ORDER — ONDANSETRON 4 MG/1
4 TABLET, ORALLY DISINTEGRATING ORAL EVERY 6 HOURS PRN
Status: DISCONTINUED | OUTPATIENT
Start: 2019-01-01 | End: 2019-01-01 | Stop reason: HOSPADM

## 2019-01-01 RX ORDER — SPIRONOLACTONE 25 MG/1
50 TABLET ORAL DAILY
Status: DISCONTINUED | OUTPATIENT
Start: 2019-01-01 | End: 2019-01-01

## 2019-01-01 RX ORDER — NICOTINE POLACRILEX 4 MG
15-30 LOZENGE BUCCAL
Status: DISCONTINUED | OUTPATIENT
Start: 2019-01-01 | End: 2019-01-01

## 2019-01-01 RX ORDER — ARIPIPRAZOLE 5 MG/1
5 TABLET ORAL DAILY
Status: DISCONTINUED | OUTPATIENT
Start: 2019-01-01 | End: 2019-01-01 | Stop reason: HOSPADM

## 2019-01-01 RX ORDER — FUROSEMIDE 40 MG
40 TABLET ORAL DAILY
Status: DISCONTINUED | OUTPATIENT
Start: 2019-01-01 | End: 2019-01-01

## 2019-01-01 RX ORDER — SPIRONOLACTONE 50 MG/1
50 TABLET, FILM COATED ORAL 2 TIMES DAILY
Qty: 60 TABLET | Refills: 3 | Status: SHIPPED | OUTPATIENT
Start: 2019-01-01 | End: 2019-01-01

## 2019-01-01 RX ORDER — ONDANSETRON 2 MG/ML
4 INJECTION INTRAMUSCULAR; INTRAVENOUS EVERY 6 HOURS PRN
Status: DISCONTINUED | OUTPATIENT
Start: 2019-01-01 | End: 2019-01-01 | Stop reason: HOSPADM

## 2019-01-01 RX ORDER — PIPERACILLIN SODIUM, TAZOBACTAM SODIUM 3; .375 G/15ML; G/15ML
3.38 INJECTION, POWDER, LYOPHILIZED, FOR SOLUTION INTRAVENOUS EVERY 6 HOURS
Status: DISCONTINUED | OUTPATIENT
Start: 2019-01-01 | End: 2019-01-01

## 2019-01-01 RX ORDER — ALBUMIN (HUMAN) 12.5 G/50ML
12.5 SOLUTION INTRAVENOUS ONCE
Status: DISCONTINUED | OUTPATIENT
Start: 2019-01-01 | End: 2019-01-01

## 2019-01-01 RX ORDER — OXYCODONE HYDROCHLORIDE 5 MG/1
5-10 TABLET ORAL EVERY 4 HOURS PRN
Status: DISCONTINUED | OUTPATIENT
Start: 2019-01-01 | End: 2019-01-01 | Stop reason: HOSPADM

## 2019-01-01 RX ORDER — NALOXONE HYDROCHLORIDE 0.4 MG/ML
.1-.4 INJECTION, SOLUTION INTRAMUSCULAR; INTRAVENOUS; SUBCUTANEOUS
Status: DISCONTINUED | OUTPATIENT
Start: 2019-01-01 | End: 2019-01-01 | Stop reason: HOSPADM

## 2019-01-01 RX ORDER — PIPERACILLIN SODIUM, TAZOBACTAM SODIUM 3; .375 G/15ML; G/15ML
3.38 INJECTION, POWDER, LYOPHILIZED, FOR SOLUTION INTRAVENOUS ONCE
Status: COMPLETED | OUTPATIENT
Start: 2019-01-01 | End: 2019-01-01

## 2019-01-01 RX ORDER — SPIRONOLACTONE 25 MG/1
50 TABLET ORAL
Status: DISCONTINUED | OUTPATIENT
Start: 2019-01-01 | End: 2019-01-01 | Stop reason: HOSPADM

## 2019-01-01 RX ORDER — LIDOCAINE HYDROCHLORIDE 10 MG/ML
8 INJECTION, SOLUTION EPIDURAL; INFILTRATION; INTRACAUDAL; PERINEURAL ONCE
Status: COMPLETED | OUTPATIENT
Start: 2019-01-01 | End: 2019-01-01

## 2019-01-01 RX ORDER — SODIUM CHLORIDE 9 MG/ML
INJECTION, SOLUTION INTRAVENOUS CONTINUOUS
Status: DISCONTINUED | OUTPATIENT
Start: 2019-01-01 | End: 2019-01-01

## 2019-01-01 RX ORDER — DEXTROSE MONOHYDRATE 25 G/50ML
25-50 INJECTION, SOLUTION INTRAVENOUS
Status: DISCONTINUED | OUTPATIENT
Start: 2019-01-01 | End: 2019-01-01

## 2019-01-01 RX ORDER — ARIPIPRAZOLE 5 MG/1
5 TABLET ORAL DAILY
COMMUNITY

## 2019-01-01 RX ADMIN — PANTOPRAZOLE SODIUM 40 MG: 40 TABLET, DELAYED RELEASE ORAL at 09:04

## 2019-01-01 RX ADMIN — PANTOPRAZOLE SODIUM 40 MG: 40 TABLET, DELAYED RELEASE ORAL at 09:52

## 2019-01-01 RX ADMIN — ARIPIPRAZOLE 5 MG: 5 TABLET ORAL at 09:09

## 2019-01-01 RX ADMIN — AMLODIPINE BESYLATE 5 MG: 5 TABLET ORAL at 09:09

## 2019-01-01 RX ADMIN — SPIRONOLACTONE 50 MG: 25 TABLET, FILM COATED ORAL at 09:52

## 2019-01-01 RX ADMIN — SPIRONOLACTONE 50 MG: 25 TABLET, FILM COATED ORAL at 09:09

## 2019-01-01 RX ADMIN — SERTRALINE HYDROCHLORIDE 100 MG: 100 TABLET ORAL at 08:59

## 2019-01-01 RX ADMIN — SERTRALINE HYDROCHLORIDE 100 MG: 100 TABLET ORAL at 08:52

## 2019-01-01 RX ADMIN — SODIUM CHLORIDE 500 ML: 9 INJECTION, SOLUTION INTRAVENOUS at 23:31

## 2019-01-01 RX ADMIN — SPIRONOLACTONE 50 MG: 25 TABLET ORAL at 10:34

## 2019-01-01 RX ADMIN — AMLODIPINE BESYLATE 5 MG: 5 TABLET ORAL at 09:04

## 2019-01-01 RX ADMIN — SPIRONOLACTONE 50 MG: 25 TABLET, FILM COATED ORAL at 08:52

## 2019-01-01 RX ADMIN — SERTRALINE HYDROCHLORIDE 100 MG: 100 TABLET ORAL at 09:09

## 2019-01-01 RX ADMIN — MORPHINE SULFATE 4 MG: 4 INJECTION INTRAVENOUS at 23:38

## 2019-01-01 RX ADMIN — AMLODIPINE BESYLATE 5 MG: 5 TABLET ORAL at 08:52

## 2019-01-01 RX ADMIN — ARIPIPRAZOLE 5 MG: 5 TABLET ORAL at 09:52

## 2019-01-01 RX ADMIN — PIPERACILLIN SODIUM,TAZOBACTAM SODIUM 3.38 G: 3; .375 INJECTION, POWDER, FOR SOLUTION INTRAVENOUS at 10:37

## 2019-01-01 RX ADMIN — AMLODIPINE BESYLATE 5 MG: 5 TABLET ORAL at 09:52

## 2019-01-01 RX ADMIN — SPIRONOLACTONE 50 MG: 25 TABLET, FILM COATED ORAL at 16:23

## 2019-01-01 RX ADMIN — ARIPIPRAZOLE 5 MG: 5 TABLET ORAL at 08:17

## 2019-01-01 RX ADMIN — POTASSIUM CHLORIDE 40 MEQ: 1500 TABLET, EXTENDED RELEASE ORAL at 09:04

## 2019-01-01 RX ADMIN — SERTRALINE HYDROCHLORIDE 100 MG: 100 TABLET ORAL at 09:52

## 2019-01-01 RX ADMIN — PANTOPRAZOLE SODIUM 40 MG: 40 TABLET, DELAYED RELEASE ORAL at 08:59

## 2019-01-01 RX ADMIN — SPIRONOLACTONE 50 MG: 25 TABLET, FILM COATED ORAL at 16:18

## 2019-01-01 RX ADMIN — ONDANSETRON 4 MG: 2 INJECTION INTRAMUSCULAR; INTRAVENOUS at 23:38

## 2019-01-01 RX ADMIN — SPIRONOLACTONE 50 MG: 25 TABLET, FILM COATED ORAL at 16:32

## 2019-01-01 RX ADMIN — ARIPIPRAZOLE 5 MG: 5 TABLET ORAL at 09:04

## 2019-01-01 RX ADMIN — PANTOPRAZOLE SODIUM 40 MG: 40 TABLET, DELAYED RELEASE ORAL at 08:17

## 2019-01-01 RX ADMIN — AMLODIPINE BESYLATE 5 MG: 5 TABLET ORAL at 08:17

## 2019-01-01 RX ADMIN — SPIRONOLACTONE 50 MG: 25 TABLET, FILM COATED ORAL at 08:17

## 2019-01-01 RX ADMIN — SPIRONOLACTONE 50 MG: 25 TABLET, FILM COATED ORAL at 16:41

## 2019-01-01 RX ADMIN — SODIUM CHLORIDE: 9 INJECTION, SOLUTION INTRAVENOUS at 04:27

## 2019-01-01 RX ADMIN — PANTOPRAZOLE SODIUM 40 MG: 40 TABLET, DELAYED RELEASE ORAL at 09:09

## 2019-01-01 RX ADMIN — ARIPIPRAZOLE 5 MG: 5 TABLET ORAL at 08:52

## 2019-01-01 RX ADMIN — PANTOPRAZOLE SODIUM 40 MG: 40 TABLET, DELAYED RELEASE ORAL at 08:52

## 2019-01-01 RX ADMIN — LIDOCAINE HYDROCHLORIDE 8 ML: 10 INJECTION, SOLUTION EPIDURAL; INFILTRATION; INTRACAUDAL; PERINEURAL at 10:06

## 2019-01-01 RX ADMIN — SERTRALINE HYDROCHLORIDE 100 MG: 100 TABLET ORAL at 08:17

## 2019-01-01 RX ADMIN — ARIPIPRAZOLE 5 MG: 5 TABLET ORAL at 14:58

## 2019-01-01 RX ADMIN — FUROSEMIDE 40 MG: 40 TABLET ORAL at 10:34

## 2019-01-01 RX ADMIN — POTASSIUM CHLORIDE 40 MEQ: 1500 TABLET, EXTENDED RELEASE ORAL at 13:09

## 2019-01-01 RX ADMIN — SERTRALINE HYDROCHLORIDE 100 MG: 100 TABLET ORAL at 09:04

## 2019-01-01 RX ADMIN — MORPHINE SULFATE 4 MG: 4 INJECTION INTRAVENOUS at 08:58

## 2019-01-01 RX ADMIN — SPIRONOLACTONE 50 MG: 25 TABLET ORAL at 14:58

## 2019-01-01 RX ADMIN — PIPERACILLIN SODIUM,TAZOBACTAM SODIUM 3.38 G: 3; .375 INJECTION, POWDER, FOR SOLUTION INTRAVENOUS at 03:29

## 2019-01-01 RX ADMIN — AMLODIPINE BESYLATE 5 MG: 5 TABLET ORAL at 08:59

## 2019-01-01 ASSESSMENT — ENCOUNTER SYMPTOMS
FLANK PAIN: 1
CHILLS: 0
ABDOMINAL PAIN: 1
FEVER: 0
HEMATURIA: 0
VOMITING: 0
BLOOD IN STOOL: 0
FREQUENCY: 1

## 2019-01-01 ASSESSMENT — ACTIVITIES OF DAILY LIVING (ADL)
ADLS_ACUITY_SCORE: 21
ADLS_ACUITY_SCORE: 20
ADLS_ACUITY_SCORE: 21
ADLS_ACUITY_SCORE: 21
ADLS_ACUITY_SCORE: 17
COGNITION: 2 - DIFFICULTY WITH ORGANIZING THOUGHTS
ADLS_ACUITY_SCORE: 17
ADLS_ACUITY_SCORE: 18
TRANSFERRING: 2-->ASSISTIVE PERSON
ADLS_ACUITY_SCORE: 18
ADLS_ACUITY_SCORE: 21
ADLS_ACUITY_SCORE: 20
DRESS: 0-->INDEPENDENT
ADLS_ACUITY_SCORE: 17
ADLS_ACUITY_SCORE: 21
ADLS_ACUITY_SCORE: 20
RETIRED_COMMUNICATION: 0-->UNDERSTANDS/COMMUNICATES WITHOUT DIFFICULTY
RETIRED_EATING: 0-->INDEPENDENT
BATHING: 0-->INDEPENDENT
AMBULATION: 2-->ASSISTIVE PERSON
TOILETING: 0-->INDEPENDENT
ADLS_ACUITY_SCORE: 20
ADLS_ACUITY_SCORE: 21
WHICH_OF_THE_ABOVE_FUNCTIONAL_RISKS_HAD_A_RECENT_ONSET_OR_CHANGE?: AMBULATION;COGNITION
ADLS_ACUITY_SCORE: 17
ADLS_ACUITY_SCORE: 17
ADLS_ACUITY_SCORE: 20
ADLS_ACUITY_SCORE: 20
ADLS_ACUITY_SCORE: 21
ADLS_ACUITY_SCORE: 20
ADLS_ACUITY_SCORE: 20
ADLS_ACUITY_SCORE: 16
ADLS_ACUITY_SCORE: 17
ADLS_ACUITY_SCORE: 20
ADLS_ACUITY_SCORE: 18
ADLS_ACUITY_SCORE: 16
FALL_HISTORY_WITHIN_LAST_SIX_MONTHS: NO
SWALLOWING: 0-->SWALLOWS FOODS/LIQUIDS WITHOUT DIFFICULTY
ADLS_ACUITY_SCORE: 16
ADLS_ACUITY_SCORE: 20

## 2019-01-01 ASSESSMENT — MIFFLIN-ST. JEOR
SCORE: 1117.75
SCORE: 1095.52
SCORE: 1144.06
SCORE: 1120.02
SCORE: 1130
SCORE: 1157.67

## 2019-01-28 PROBLEM — R10.84 ABDOMINAL PAIN, GENERALIZED: Status: ACTIVE | Noted: 2019-01-01

## 2019-01-28 PROBLEM — I85.11 ESOPHAGEAL VARICES WITH BLEEDING IN DISEASES CLASSIFIED ELSEWHERE (H): Status: ACTIVE | Noted: 2017-06-12

## 2019-01-28 PROBLEM — N39.0 UTI (URINARY TRACT INFECTION): Status: ACTIVE | Noted: 2019-01-01

## 2019-01-28 NOTE — H&P
[facility]    History and Physical - Hospitalist Service       Date of Admission:  1/27/2019    Assessment & Plan   Catina Barrow is a 78 year old female admitted on 1/27/2019. She presents with abdominal pain.       Abdominal pain, generalized    Urinary Tract Infection    Assessment: Presents with 2 weeks of generalized abdominal pain.  She does have a history of what appears to be nonobstructing ventral hernia.  She has known ascites.  CT shows mild stranding and nodularity in the intraperitoneal fat of the anterior aspect of the mid abdomen is nonspecific, but peritoneal carcinomatosis is a possibility.  At this point differential includes SBP versus malignancy versus abdominal pain secondary to what appears to be urinary tract infection.    Plan:   - Admit inpatient  - Continue Zosyn  - Follow urine cultures  - Would like general surgery to evaluate her abdominal pain and her abnormal-CAT scan  - Obtain therapeutic and diagnostic paracentesis  - Keep n.p.o.    Hypoxia  Dyspnea  Assessment: On admission was hypoxic with sats in the mid 80s requiring up to 4 L nasal cannula. CXR showed A few hazy opacities in the left lung base could represent atelectasis or pneumonia.  Plan:  -Continue IV Zosyn  -Continue supplemental O2 as needed, wean as able       Diastolic dysfunction    Assessment: Other than her ascites she appears relatively euvolemic.    Plan:   -Follow I's and O's  -Continue PTA lisinopril once verified by pharmacy      Cirrhosis    Esophageal varices with bleeding in diseases classified elsewhere (H)    Assessment: Stable    Plan:   -Continue PTA Protonix      Thrombocytopenia (H)    Assessment/Plan: platelets of 102, likely related to her cirrhosis.  Will monitor for now       Diet: NPO  DVT Prophylaxis: Pneumatic Compression Devices  Desai Catheter: not present  Code Status: FULL CODE    Disposition Plan   Expected discharge: 2 - 3 days, recommended to transitional care unit once  pending surgical plan.  Entered: Brenden Shine MD 01/28/2019, 3:00 AM     The patient's care was discussed with the Patient.    Brenden Shine MD  [facility]    ______________________________________________________________________    Chief Complaint     Abdominal pain    History is obtained from the patient    History of Present Illness      Catina Barrow is a 78 year old female with PMH of hypertension, CVA who presents for evaluation of abdominal pain.    Patient reports that she has been having generalized abdominal pain for the last 2 weeks.  She has a history of umbilical hernia that was diagnosed several weeks ago.  At that time it was noted to be nonobstructing.  Patient then went on a trip to [place] at which time during her trip she noticed that she had been having increased abdominal pain and urinary frequency along with right flank pain that she rates at a 8/10.  She has not been having any bloody stools, she has not had any nausea/vomiting.  She has no fevers/chills.  She has no blood in her stool.  She does have a history of ascites.  She did not endorse any chest pain/shortness of breath. She has no other complaints at this time. She has known ascites, and has had paracentesis the past per family, though they are unsure of whether cytology or fluid analysis has been done.  Her  endorse that she has lost some weight, and her ascites has worsened over the last couple weeks.    Review of Systems    The 10 point Review of Systems is negative other than noted in the HPI or here.    Past Medical History    I have reviewed this patient's medical history and updated it with pertinent information if needed.   Past Medical History:   Diagnosis Date     Anemia, unspecified      Diastolic dysfunction      Hemorrhagic stroke (H)     hypertensive cerrebellar CVA     Hypertension      Recurrent major depression in remission (H)      Right wrist fracture        Past Surgical History   I have  reviewed this patient's surgical history and updated it with pertinent information if needed.  Past Surgical History:   Procedure Laterality Date     CHOLECYSTECTOMY       ESOPHAGOSCOPY, GASTROSCOPY, DUODENOSCOPY (EGD), COMBINED N/A 6/12/2017    Procedure: COMBINED ESOPHAGOSCOPY, GASTROSCOPY, DUODENOSCOPY (EGD);  COMBINED ESOPHAGOSCOPY, GASTROSCOPY, DUODENOSCOPY (EGD);  Surgeon: Frank Lopez DO;  Location:  GI     GYN SURGERY      hysterectomy     KERATOTOMY ARCUATE WITH FEMTOSECOND LASER/IMAGING FOR ATIOL Left 4/18/2016    Procedure: KERATOTOMY ARCUATE WITH FEMTOSECOND LASER/IMAGING FOR ATIOL;  Surgeon: Bebeto Shannon MD;  Location:  EC     KERATOTOMY ARCUATE WITH FEMTOSECOND LASER/IMAGING FOR ATIOL Right 4/25/2016    Procedure: KERATOTOMY ARCUATE WITH FEMTOSECOND LASER/IMAGING FOR ATIOL;  Surgeon: Bebeto Shannon MD;  Location:  EC     PHACOEMULSIFICATION CLEAR CORNEA WITH STANDARD INTRAOCULAR LENS IMPLANT Left 4/18/2016    Procedure: PHACOEMULSIFICATION CLEAR CORNEA WITH STANDARD INTRAOCULAR LENS IMPLANT;  Surgeon: Bebeto Shannon MD;  Location:  EC     PHACOEMULSIFICATION CLEAR CORNEA WITH STANDARD INTRAOCULAR LENS IMPLANT Right 4/25/2016    Procedure: PHACOEMULSIFICATION CLEAR CORNEA WITH STANDARD INTRAOCULAR LENS IMPLANT;  Surgeon: Bebeto Shannon MD;  Location: [facility]       Social History   I have reviewed this patient's social history and updated it with pertinent information if needed.  Social History     Tobacco Use     Smoking status: Never Smoker     Smokeless tobacco: Never Used   Substance Use Topics     Alcohol use: No     Drug use: No     Family History   I have reviewed this patient's family history and updated it with pertinent information if needed.   Family History   Problem Relation Age of Onset     Hypertension Father      Prior to Admission Medications   Prior to Admission Medications   Prescriptions Last Dose Informant Patient Reported? Taking?   AmLODIPine Besylate  (NORVASC PO)  Spouse/Significant Other Yes No   Sig: Take 5 mg by mouth daily   LISINOPRIL PO  Spouse/Significant Other Yes No   Sig: Take 2.5 mg by mouth daily    SERTRALINE HCL PO  Spouse/Significant Other Yes No   Sig: Take 100 mg by mouth daily    ascorbic acid (VITAMIN C) 500 MG tablet  Spouse/Significant Other No No   Sig: Take 1 tablet (500 mg) by mouth daily   ferrous sulfate (IRON) 325 (65 FE) MG tablet  Spouse/Significant Other No No   Sig: Take 1 tablet (325 mg) by mouth 2 times daily (with meals)   pantoprazole (PROTONIX) 40 MG enteric coated tablet  Spouse/Significant Other No No   Sig: Take 1 tablet (40 mg) by mouth daily      Facility-Administered Medications: None     Allergies   Allergies   Allergen Reactions     Codeine        Physical Exam   Vital Signs: Temp: 98.1  F (36.7  C) Temp src: Oral BP: 118/60   Heart Rate: 81 Resp: 16 SpO2: 94 % O2 Device: Nasal cannula(placed pt on 4LPM when  I noticed pts o2 had dropped to 85%. ) Oxygen Delivery: 4 LPM  Weight: 140 lbs 0 oz    Constitutional: awake, alert, cooperative, no apparent distress, and appears stated age  Eyes: Lids and lashes normal, pupils equal, round and reactive to light, extra ocular muscles intact, sclera clear, conjunctiva normal  ENT: Normocephalic, without obvious abnormality, atraumatic, sinuses nontender on palpation, external ears without lesions, oral pharynx with moist mucous membranes.  Hematologic / Lymphatic: no cervical lymphadenopathy  Respiratory: No increased work of breathing, good air exchange, clear to auscultation bilaterally, no crackles or wheezing  Cardiovascular: Normal apical impulse, regular rate and rhythm, normal S1 and S2, no S3 or S4, and no murmur noted  GI: distended, non-tender, no guarding.  Skin: normal skin color, texture, turgor  Musculoskeletal: There is no redness, warmth, or swelling of the joints.  Full range of motion noted.  Motor strength is 5 out of 5 all extremities bilaterally.  Tone is  normal.  Neurologic: Awake, alert, oriented to name, place and time.  Cranial nerves II-XII are grossly intact.  Motor is 5 out of 5 bilaterally.   Neuropsychiatric: normal mood and affect    Data   Data reviewed today: I reviewed all medications, new labs and imaging results over the last 24 hours. I personally reviewed the chest x-ray image(s) showing see below and the abdominal CT image(s) showing see below.    Most Recent 3 CBC's:  Recent Labs   Lab Test 01/27/19 2240 06/13/17  1231 06/13/17 0635 06/12/17  0454   WBC 6.5  --  2.4*  --  5.6   HGB 10.8* 9.1* 8.6*   < > 10.8*   MCV 87  --  96  --  95   *  --  58*  --  100*    < > = values in this interval not displayed.     Most Recent 3 BMP's:  Recent Labs   Lab Test 01/27/19 2240 06/13/17 0635 06/12/17 0454    146* 144   POTASSIUM 3.5 3.9 4.2   CHLORIDE 105 118* 113*   CO2 21 22 22   BUN 15 18 27   CR 0.72 0.72 0.70   ANIONGAP 10 6 9   KEVIN 8.7 7.9* 8.4*   GLC 96 97 138*     Most Recent 2 LFT's:  Recent Labs   Lab Test 01/27/19 2240 06/12/17  0454   AST 16 22   ALT 15 24   ALKPHOS 76 68   BILITOTAL 1.1 0.6     Most Recent 3 INR's:  Recent Labs   Lab Test 01/27/19 2240 06/13/17 0635 06/12/17  0454   INR 1.39* 1.39* 1.23*     Most Recent 3 Troponin's:  Recent Labs   Lab Test 10/07/16  1040 03/11/16 2010 03/11/16  1558   TROPI <0.015  The 99th percentile for upper reference range is 0.045 ug/L.  Troponin values in   the range of 0.045 - 0.120 ug/L may be associated with risks of adverse   clinical events.   <0.015  The 99th percentile for upper reference range is 0.045 ug/L.  Troponin values in   the range of 0.045 - 0.120 ug/L may be associated with risks of adverse   clinical events.   <0.015  The 99th percentile for upper reference range is 0.045 ug/L.  Troponin values in   the range of 0.045 - 0.120 ug/L may be associated with risks of adverse   clinical events.       Most Recent 3 BNP's:No lab results found.  Recent Results (from the past  24 hour(s))   CT Abdomen Pelvis without Contrast (stone protocol)    Narrative    CT ABDOMEN AND PELVIS WITHOUT CONTRAST   1/27/2019 11:55 PM     HISTORY: Right flank pain. Abdominal distention.    COMPARISON: 10/7/2016 - CT chest, abdomen and pelvis.    TECHNIQUE: Without intravenous contrast, helical sections were  acquired from the top of the diaphragm through the pubic symphysis.  Coronal reconstructions were generated. Radiation dose for this scan  was reduced using automated exposure control, adjustment of the mA  and/or kV according to the patient's size, or iterative reconstruction  technique.    FINDINGS:     Abdomen: Lobulated outer contour of the liver, consistent with  cirrhosis. Moderate splenomegaly. The spleen measures 16.1 cm in  craniocaudal dimension. Two nonspecific low-attenuation lesions in the  posterior aspect of the spleen, the largest measuring 2.5 cm, also  present on 10/7/2016. The liver, spleen, pancreas, adrenal glands and  left kidney are otherwise unremarkable to the limits of a noncontrast  CT scan. Two small cysts in the upper pole of the right kidney, the  largest measuring 2 cm. No visualized renal or ureteral calculi  bilaterally. No dilatation of the intrarenal collecting systems or  ureter. Prior cholecystectomy. Small hiatal hernia. A 1.7 cm long  metallic structure is again present in the gastric cardia region and  likely relates to prior surgery. No enlarged lymph nodes in the upper  abdomen. A large amount of free fluid in the upper abdomen.  Atherosclerotic calcification in the abdominal aorta. Mild stranding  and nodularity in the intraperitoneal fat of the anterior aspect of  the mid abdomen (for example, series 2, image 40).    Scan through the lower chest is significant for coronary artery  calcification.    Pelvis: The small and large bowel are normal in caliber. Several  colonic diverticula are present without convincing evidence of  diverticulitis. The appendix is  not visualized. No bowel wall  thickening, pneumatosis or free intraperitoneal gas. The uterus is not  visualized. Moderate distention of the urinary bladder. A large amount  of free fluid in the pelvis. No enlarged lymph nodes in the pelvis.  Small to moderate-sized paraumbilical hernia containing fat.       Impression    IMPRESSION:   1. Cirrhotic-appearing liver with evidence of portal hypertension,  including moderate splenomegaly.  2. A large amount of free intraperitoneal fluid.  3. Mild stranding and nodularity in the intraperitoneal fat of the  anterior aspect of the mid abdomen is nonspecific, but peritoneal  carcinomatosis is a possibility.  4. No renal or ureteral calculi or evidence of urinary obstruction.    PRASHANT CASTRO MD   Chest XR,  PA & LAT    Narrative    CHEST 2 VIEWS   1/28/2019 2:18 AM     HISTORY: Weakness. Hypoxemia.    COMPARISON: None.    FINDINGS: A few hazy opacities in the left lung base. 0.6 cm calcified  granuloma in the upper right lung. Normal-sized cardiac silhouette.      Impression    IMPRESSION:   1. A few hazy opacities in the left lung base could represent  atelectasis or pneumonia.  2. No other findings suspicious for active cardiopulmonary disease.    PRASHANT CASTRO MD

## 2019-01-28 NOTE — CONSULTS
Care Transition Initial Assessment - SW     Met with: Patient and Family    Principal Problem:    Abdominal pain, generalized  Active Problems:    Diastolic dysfunction    Esophageal varices with bleeding in diseases classified elsewhere (H)    Thrombocytopenia (H)    UTI (urinary tract infection)       DATA  Lives With: spouse      Quality of Family Relationships: supportive, involved  Description of Support System: Involved, Supportive  Who is your support system?: , Children  Identified issues/concerns regarding health management: discharge planning.     Quality of Family Relationships: supportive, involved    ASSESSMENT  Cognitive Status:  awake, alert and oriented  Concerns to be addressed: Per SW consult for discharge planning. Pt admitted 1/28/19, discharge pending. Pt lives with spouse. SW met with pt,  Gray and daughter Cleo in room. SW sharing TCU recommendations. Pt opposed to TCU, family wants TCU. SW provided list of facilities and spoke with pt and family about TCU coverage, etc. Pt and family need to discuss placement and options. SW will check back tomorrow for TCU choices. Private room would be preferred.    PLAN  Financial costs for the patient includes: private room  Patient given options and choices for discharge: TCU   Patient/family is agreeable to the plan?  Yes   Patient Goals and Preferences: TCU vs home  Patient anticipates discharging to: TCU vs home    MYRIAM Zelaya

## 2019-01-28 NOTE — PROGRESS NOTES
Tolerated right paracentesis well. 4450 ml yellow fluid removed per Dr. Finch. Bandaid site soft, clean, dry. Will call report to pt's nurse on station [id number]. Ready for transport back to [facility].

## 2019-01-28 NOTE — PLAN OF CARE
Pt A/O X4. VSS on 4L. CMS intact. Abdominal distention, pt denying pain. Bladder scan 725, straight cath. NPO. Paracentesis this a.m. Continue to monitor.

## 2019-01-28 NOTE — CONSULTS
General Surgery Consultation     Catina Barrow MRN# [id number]   YOB: 1940 Age: 78 year old   Date of Admission: 1/27/2019     Reason for consult: I was asked by SUNIL Carlson to evaluate this patient for abdominal pain.           Assessment and Plan:   Principal Problem:    Abdominal pain, generalized    Assessment: likely secondary to cirrhosis/ascites    Plan: had paracentesis this AM - await pathologic/micro evaluation  Active Problems:    Diastolic dysfunction    Esophageal varices with bleeding in diseases classified elsewhere (H)    Thrombocytopenia (H)     UTI (urinary tract infection)  All beign addressed by medical team               Chief Complaint:   Abdominal pain - right upper abdomen and back    History is obtained from the patient, electronic health record, emergency department physician and patient's daughter         History of Present Illness:   This patient is a 78 year old female who presents with right upper abdominal and back pain.  She has a known history of cirrhosis and ascites that have been managed with medication, she has had paracentesis in the past.  This time she was in [place] on vacation, came back earlier because of her symptoms.  CT scan of the abdomen shows liver cirrhosis, portal hypertension, ascites, some nonspecific nodularities and stranding of the intraperitoneal fat, this could be secondary to her cirrhosis or possibly but less likely cancerous in nature.  She has a known umbilical hernia that reduces easily.            Past Medical History:     Past Medical History:   Diagnosis Date     Anemia, unspecified      Diastolic dysfunction      Hemorrhagic stroke (H)     hypertensive cerrebellar CVA     Hypertension      Recurrent major depression in remission (H)      Right wrist fracture              Past Surgical History:     Past Surgical History:   Procedure Laterality Date     CHOLECYSTECTOMY       ESOPHAGOSCOPY, GASTROSCOPY, DUODENOSCOPY (EGD), COMBINED N/A  6/12/2017    Procedure: COMBINED ESOPHAGOSCOPY, GASTROSCOPY, DUODENOSCOPY (EGD);  COMBINED ESOPHAGOSCOPY, GASTROSCOPY, DUODENOSCOPY (EGD);  Surgeon: Frank Lopez DO;  Location:  GI     GYN SURGERY      hysterectomy     KERATOTOMY ARCUATE WITH FEMTOSECOND LASER/IMAGING FOR ATIOL Left 4/18/2016    Procedure: KERATOTOMY ARCUATE WITH FEMTOSECOND LASER/IMAGING FOR ATIOL;  Surgeon: Bebeto Shannon MD;  Location:  EC     KERATOTOMY ARCUATE WITH FEMTOSECOND LASER/IMAGING FOR ATIOL Right 4/25/2016    Procedure: KERATOTOMY ARCUATE WITH FEMTOSECOND LASER/IMAGING FOR ATIOL;  Surgeon: Bebeto Shannon MD;  Location:  EC     PHACOEMULSIFICATION CLEAR CORNEA WITH STANDARD INTRAOCULAR LENS IMPLANT Left 4/18/2016    Procedure: PHACOEMULSIFICATION CLEAR CORNEA WITH STANDARD INTRAOCULAR LENS IMPLANT;  Surgeon: Bebeto Shannon MD;  Location:  [facility]     PHACOEMULSIFICATION CLEAR CORNEA WITH STANDARD INTRAOCULAR LENS IMPLANT Right 4/25/2016    Procedure: PHACOEMULSIFICATION CLEAR CORNEA WITH STANDARD INTRAOCULAR LENS IMPLANT;  Surgeon: Bebeto Shannon MD;  Location: [facility]   She is also had what looks like a vertical banded gastroplasty.             Social History:   I have reviewed this patient's social history          Family History:     Family History   Problem Relation Age of Onset     Hypertension Father              Immunizations:     Immunization History   Administered Date(s) Administered     Influenza (High Dose) 3 valent vaccine 10/09/2016             Allergies:     Allergies   Allergen Reactions     Codeine              Medications:     Medications Prior to Admission   Medication Sig Dispense Refill Last Dose     AmLODIPine Besylate (NORVASC PO) Take 5 mg by mouth daily   6/11/2017 at Unknown time     ARIPiprazole (ABILIFY) 5 MG tablet Take 5 mg by mouth daily        ascorbic acid (VITAMIN C) 500 MG tablet Take 1 tablet (500 mg) by mouth daily 30 tablet 0 6/11/2017 at Unknown time     ferrous sulfate (IRON)  325 (65 FE) MG tablet Take 1 tablet (325 mg) by mouth 2 times daily (with meals) (Patient taking differently: Take 325 mg by mouth daily (with breakfast) ) 100 tablet 0 6/11/2017 at am     furosemide (LASIX) 40 MG tablet Take 40 mg by mouth daily        pantoprazole (PROTONIX) 40 MG enteric coated tablet Take 1 tablet (40 mg) by mouth daily 30 tablet 0 6/10/2017     SERTRALINE HCL PO Take 100 mg by mouth daily    6/11/2017 at Unknown time     spironolactone (ALDACTONE) 50 MG tablet Take 50 mg by mouth daily                Review of Systems:   The 10 point Review of Systems is negative other than noted in the HPI           Physical Exam:   Vitals were reviewed  Constitutional:   awake, alert, cooperative, no apparent distress, appears older than stated age and cachectic     Eyes:   sclera clear     Neck:   supple, symmetrical, trachea midline     Lungs:   no increased work of breathing, good air exchange and no retractions     Abdomen:   scars noted and well healed, soft, non-distended, non-tender, involuntary guarding absent, liver palpated 3-4 cm below the costal margin, hernia present and reducible at umbilicus, and ascites absent at this time.     Musculoskeletal:   tone is normal     Skin:   Thin, frail, dry and warm             Data:   All laboratory and imaging data in the past 24 hours reviewed

## 2019-01-28 NOTE — PLAN OF CARE
A&Ox4. VSS on 3 L NC. Attempt to wean but dropped to 88% on 1-2 L. Soft BP intermittently. Abdomen distended with umbilical hernia present. Paracentesis performed today with 4450 mL removed. Sent for cx. UC pending as well. Assist of 2 with GB. Reported abdominal pain prior to procedure of 8/10 treated with IV Morphine- effective. 3G Na diet. General surgery consulted and no planned surgical interventions at this time, awaiting paracentesis culture results. Daughter and  at bedside. Discharge pending. Nursing will continue to monitor.

## 2019-01-28 NOTE — PLAN OF CARE
RECEIVING UNIT ED HANDOFF REVIEW    ED Nurse Handoff Report was reviewed by: Vanessa Owen on January 28, 2019 at 3:29 AM

## 2019-01-28 NOTE — ED PROVIDER NOTES
History     Chief Complaint:  Abdominal Pain    HPI   Catina Barrow is a 78 year old female with a history of HTN, stroke who presents to the emergency department today for evaluation of abdominal pain. The patient reports she has experienced abdominal pain for around 2 weeks. She states she has a history of umbilical hernia, first diagnosed several weeks ago. The patient was seen by her PCP for this hernia, and it was noted to be nonobstructing, so the patient was told she could go to a planned trip to [place]. She indicates that in [place], she began developing abdominal pain and urinary frequency, as well as right flank pain. She rates her pain 8/10 currently. The patient denies any bloody stools, vomiting, decreased appetite, fever, chills, hematuria. She remarks she has a history of ascites. She denies any history of heart disease.    Allergies:  Codeine     Medications:    Norvasc  Lisinopril  Protonix  Sertraline  Celexa    Past Medical History:    Diastolic dysfunction  Hemorrhagic stroke  Hypertension  Depression    Past Surgical History:    Cholecystectomy  Hysterectomy  Bilateral keratotomy arcuate with femto second laser/imaging for atiol  Bilateral phacoemulsification clear cornea with standard intraocular lens implant    Family History:    Father: hypertension    Social History:  Presents with  and daughter.  Smoking Status: Never Smoker  Smokeless Tobacco: Never Used  Alcohol Use: Negative  Drug Use: Negative  Marital Status:       Review of Systems   Constitutional: Negative for chills and fever.   Gastrointestinal: Positive for abdominal pain. Negative for blood in stool and vomiting.   Genitourinary: Positive for flank pain and frequency. Negative for hematuria.   All other systems reviewed and are negative.        Physical Exam     Patient Vitals for the past 24 hrs:   BP Temp Temp src Heart Rate Resp SpO2 Height Weight   01/28/19 0320 132/65 -- -- -- -- 90 % -- --   01/28/19 0148  "-- -- -- -- -- 94 % -- --   01/28/19 0146 -- -- -- -- -- (!) 85 % -- --   01/28/19 0110 -- -- -- -- -- 92 % -- --   01/27/19 2330 118/60 -- -- -- -- 91 % -- --   01/27/19 2138 -- -- -- 81 -- 95 % -- --   01/27/19 2136 -- 98.1  F (36.7  C) Oral -- -- -- -- --   01/27/19 2134 153/70 -- -- -- 16 -- 1.702 m (5' 7\") 63.5 kg (140 lb)       Physical Exam  Constitutional:  Appears well-developed and well-nourished. Cooperative.   HENT:   Head:    Atraumatic.   Mouth/Throat:   Oropharynx is without erythema or exudate and mucous membranes are dry.   Eyes:    Conjunctivae normal and EOM are normal.      Pupils are equal, round, and reactive to light.   Neck:    Normal range of motion. Neck supple.   Cardiovascular:  Normal rate, regular rhythm, normal heart sounds and radial and dorsalis pedis pulses are 2+ and symmetric.    Pulmonary/Chest:  Effort normal and breath sounds normal.   Abdominal:   Soft. Bowel sounds are normal.      No splenomegaly or hepatomegaly. No tenderness. No rebound.     Distended nontender abdomen, fluid wave present.     Soft, reducible umbilical hernia, nontender.   Musculoskeletal:  Normal range of motion. Trace edema to bilateral lower extremities. No tenderness.   Neurological:  Alert. Normal strength. No cranial nerve deficit.  Skin:    Skin is warm and dry.   Psychiatric:   Normal mood and affect.     Emergency Department Course     Imaging:  Radiology findings were communicated with the patient and family who voiced understanding of the findings.    XR Chest 2 views:   1. A few hazy opacities in the left lung base could represent  atelectasis or pneumonia.  2. No other findings suspicious for active cardiopulmonary disease. As per radiology.     CT Abdomen/Pelvis w/o IV contrast-stone protocol:   1. Cirrhotic-appearing liver with evidence of portal hypertension,  including moderate splenomegaly.  2. A large amount of free intraperitoneal fluid.  3. Mild stranding and nodularity in the " intraperitoneal fat of the  anterior aspect of the mid abdomen is nonspecific, but peritoneal  carcinomatosis is a possibility.  4. No renal or ureteral calculi or evidence of urinary obstruction. As per radiology.     Laboratory:  UA with micro: Ketones 10, Albumin 10, Nitrite Positive, Leukocyte Esterase Large, WBC 29 (H), RBC 3 (H), Bacteria Many, Mucous Present, o/w negative    CBC: WBC: 6.5, HGB: 10.8 (L), PLT: 102 (L)  BMP: all WNL (Creatinine: 0.72)  Hepatic panel: Bilirubin Direct 0.4 (H), Albumin 3.3 (L), o/w WNL    Lactic acid: 1.3    Lipase: 91    Ammonia: 13    INR: 1.39 (H)    PTT: 35    Urine culture aerobic bacterial pending.    Interventions:  2331  mL IV BOLUS  2338 Morphine 4 mg IV   Zofran 4 mg IV  Zosyn 3.375 g IV infusing on admission     Emergency Department Course:  Nursing notes and vitals reviewed. 2238 I performed an exam of the patient as documented above.     IV inserted. Medicine administered as documented above. Blood drawn. This was sent to the lab for further testing, results above.    The patient provided a urine sample here in the emergency department. This was sent for laboratory testing, findings above.     The patient was sent for a CT Abd/Pelvis, XR Chest while in the emergency department, findings above.     0255 I rechecked the patient and discussed the results of her workup thus far.     0300 I spoke with Dr. Shine, hospitalist, who agreed to admit the patient.    Findings and plan explained to the Patient who consents to admission. Discussed the patient with Dr. Shine, who will admit the patient to a medical bed for further monitoring, evaluation, and treatment.    I personally reviewed the laboratory results with the Patient and answered all related questions prior to admission.    Impression & Plan      Medical Decision Making:  Catina Barrow is a 78 year old female who presents complaining of abdominal pain, distension, decreased appetite, nausea, and loose stools.  Symptoms have been present for the last week or two, but have been progressively worsening. She has also started to feel generally weak.     On initial exam, she has some abdominal distension, mild diffuse tenderness, and likely ascites. Clinically, she looks dehydrated with very dry oral mucosa. IV was established. She was given half a liter of fluids. Lactate was normal. Despite her cirrhosis history, hepatic panel and ammonium look normal. CBC with differential is unremarkable aside from a stable low hemoglobin at 10.8. BMP and coags are also normal, along with her lipase. CT scan of the abdomen and pelvis shows a nonspecific inflammatory change in the fat of the anterior abdomen that the radiologist states is nonspecific but could represent something like carcinomatosa. The patient states she had a CT scan done a couple of weeks ago through her PCP that showed her periumbilical hernia. I then tried to access these results to determinate if any of this inflammation was seen on past CTs, but I was unable to view this image or the report of it.     UA suggests infection. I gave her Zosyn to cover for UTI or also for an intraabdominal infection such as SBP, although the patient does not have a particularly tender or concerning abdominal exam. While in the ED, the patient was noted to have borderline low oxygen saturations. She denies cough, dyspnea, or chest pain. Chest x-ray showed an atelectasis versus infiltration of the left lung base. It may be that the patient is having some restriction in her breathing because of her marked ascites. I do not think that this is likely pneumonia. Oxygen saturations were normal and respirations were nonlabored on 2 L.     Because of the patient's pain, weakness, and difficulty with eating, as well as some gait instability, we will bring her in to the hospital. I discussed test results and the plan for hospitalization with the patient and her family, and they voiced  understanding. Dr. Shine from the hospital service will accept the patient for admission.     Diagnosis:    ICD-10-CM   1. Abdominal pain, generalized R10.84   2. Urinary tract infection without hematuria, site unspecified N39.0   3. Other ascites R18.8   4. Generalized muscle weakness M62.81   5. Hypoxemia R09.02       Disposition:  Admitted to Dr. Shine.    Scribe Disclosure:  I, Rajeev Cummings, am serving as a scribe on 1/27/2019 at 10:28 PM to personally document services performed by Pola Pompa MD based on my observations and the provider's statements to me.      EMERGENCY DEPARTMENT       Pola Pompa MD  01/28/19 0753

## 2019-01-28 NOTE — PHARMACY-ADMISSION MEDICATION HISTORY
Admission medication history interview status for the 1/27/2019  admission is complete. See EPIC admission navigator for prior to admission medications     Medication history source reliability:Good    Actions taken by pharmacist (provider contacted, etc): called  for list and called [facility] to confirm iris     Additional medication history information not noted on PTA med list :None    Medication reconciliation/reorder completed by provider prior to medication history? No    Time spent in this activity: 20 min    Prior to Admission medications    Medication Sig Last Dose Taking? Auth Provider   AmLODIPine Besylate (NORVASC PO) Take 5 mg by mouth daily  Yes Unknown, Entered By History   ARIPiprazole (ABILIFY) 5 MG tablet Take 5 mg by mouth daily  Yes Unknown, Entered By History   ascorbic acid (VITAMIN C) 500 MG tablet Take 1 tablet (500 mg) by mouth daily  Yes Gina Amador PA-C   ferrous sulfate (IRON) 325 (65 FE) MG tablet Take 1 tablet (325 mg) by mouth 2 times daily (with meals)  Patient taking differently: Take 325 mg by mouth daily (with breakfast)   Yes Gina Amador PA-C   furosemide (LASIX) 40 MG tablet Take 40 mg by mouth daily  Yes Unknown, Entered By History   pantoprazole (PROTONIX) 40 MG enteric coated tablet Take 1 tablet (40 mg) by mouth daily  Yes Nga Hamm MD   SERTRALINE HCL PO Take 100 mg by mouth daily   Yes Reported, Patient   spironolactone (ALDACTONE) 50 MG tablet Take 50 mg by mouth daily  Yes Unknown, Entered By History

## 2019-01-28 NOTE — PROGRESS NOTES
RADIOLOGY PROCEDURE NOTE  Patient name: Catina Barrow  MRN: [id number]  : 1940    Pre-procedure diagnosis: Ascites  Post-procedure diagnosis: Same    Procedure Date/Time: 2019  9:59 AM  Procedure: Paracentesis  Estimated blood loss: None  Specimen(s) collected with description: Ascites.  The patient tolerated the procedure well with no immediate complications.    See imaging dictation for procedural details and findings.    Provider name: Xavier Finch  Assistant(s):None

## 2019-01-28 NOTE — ED NOTES
Walked with pt to the restroom. Pt was very unsteady and needed my assistance to walk. Pt seemed to be off balance.

## 2019-01-28 NOTE — ED NOTES
"[facility]  ED Nurse Handoff Report    ED Chief complaint: Abdominal Pain (Pt reports being diagnosed with an abdominal hernia 2 weeks ago, pt presents today with worsening pain at hernia site and well as right sided flank pain.)      ED Diagnosis:   Final diagnoses:   None       Code Status: to be assessed by admitting provider     Allergies:   Allergies   Allergen Reactions     Codeine        Activity level - Baseline/Home:  Independent    Activity Level - Current:   Stand with Assist     Needed?: No    Isolation: No  Infection: Not Applicable  Bariatric?: No    Vital Signs:   Vitals:    01/27/19 2136 01/27/19 2138 01/27/19 2330 01/28/19 0110   BP:   118/60    Resp:       Temp: 98.1  F (36.7  C)      TempSrc: Oral      SpO2:  95% 91% 92%   Weight:       Height:           Cardiac Rhythm: ,        Pain level: 0-10 Pain Scale: 2    Is this patient confused?: No   Does this patient have a guardian?  No         If yes, is there guardianship documents in the Epic \"Code/ACP\" activity?  N/A         Guardian Notified?  N/A  [place] - Suicide Severity Rating Scale Completed?  Yes  If yes, what color did the patient score?  White    Patient Report: Initial Complaint: Patient arrived to the ED today with complaint of abdominal pain which she has had for about two weeks. Patient was diagnosed with a hernia that was non obstructive several weeks ago and was told that she could go on her planned vacation to [place]. She states that she started to get the abdominal pain when she was in [place] and that she has also had urinary frequency.  Focused Assessment: Respiratory: Patients oxygen saturations dropped into the high 80's during her stay here. Patient denied feeling short of breath but was placed on 3L o2 in order to get her sats to say in the 90's.  Cardiac: WDL   Neuro: WDL   : Patient was unable to urinate during her stay so she was straight cathed. Her bladder was fully drained and she was able " to get 550mL of dark selin urine output.   GI: Patient complains of abdominal pain that has been going on for the past few weeks located in her right flank and umbilicus area   Musculoskeletal: Patient family notes that she has been increasingly weak for the last few weeks and is now requiring assist in order to ambulate.   Tests Performed: labs, Chest Xray, CT abdomen and pelvis,   Abnormal Results:   Labs Ordered and Resulted from Time of ED Arrival Up to the Time of Departure from the ED   ROUTINE UA WITH MICROSCOPIC - Abnormal; Notable for the following components:       Result Value    Ketones Urine 10 (*)     Protein Albumin Urine 10 (*)     Nitrite Urine Positive (*)     Leukocyte Esterase Urine Large (*)     WBC Urine 29 (*)     RBC Urine 3 (*)     Bacteria Urine Many (*)     Mucous Urine Present (*)     All other components within normal limits   CBC WITH PLATELETS DIFFERENTIAL - Abnormal; Notable for the following components:    Hemoglobin 10.8 (*)     Hematocrit 33.3 (*)     RDW 16.1 (*)     Platelet Count 102 (*)     Absolute Lymphocytes 0.3 (*)     All other components within normal limits   INR - Abnormal; Notable for the following components:    INR 1.39 (*)     All other components within normal limits   HEPATIC PANEL - Abnormal; Notable for the following components:    Bilirubin Direct 0.4 (*)     Albumin 3.3 (*)     All other components within normal limits   PARTIAL THROMBOPLASTIN TIME   BASIC METABOLIC PANEL   AMMONIA   LACTIC ACID WHOLE BLOOD   LIPASE     Treatments provided: Fluids, Meds     Family Comments: Daughter and  at bedside     OBS brochure/video discussed/provided to patient/family: N/A              Name of person given brochure if not patient: n/a              Relationship to patient: n/a    ED Medications:   Medications   morphine (PF) injection 4 mg (4 mg Intravenous Given 1/27/19 2338)   ondansetron (ZOFRAN) injection 4 mg (4 mg Intravenous Given 1/27/19 2338)   0.9% sodium  chloride BOLUS (500 mLs Intravenous New Bag 1/27/19 2013)       Drips infusing?:  No    For the majority of the shift this patient was Green.   Interventions performed were none.    Severe Sepsis OR Septic Shock Diagnosis Present: No    To be done/followed up on inpatient unit:  monitor oxygen saturations, control pain, continue to monitor     ED NURSE PHONE NUMBER: *[id number]

## 2019-01-29 NOTE — PROGRESS NOTES
[facility]    Hospitalist Progress Note    Assessment & Plan   Catina Barrow is a 78 year old female with cirrhosis and ascites who was admitted on 1/27/2019 with abdominal pain:    Impression:   Principal Problem:    Abdominal pain, generalized  Active Problems:    Diastolic dysfunction    Esophageal varices with bleeding in diseases classified elsewhere (H)    Thrombocytopenia (H)    UTI (urinary tract infection)      Plan:  Paracentesis planned today and assess after that. Home medications ordered    Addendum  -- abdominal pain completely resolved after paracentesis.  Discussed with daughter and .      DVT Prophylaxis: Pneumatic Compression Devices  Code Status: Full Code    Disposition: Expected discharge in 1 days once abdominal fluid results back.    Leon Gustafson MD  Pager [phone]  Cell Phone [phone]  Text Page (7am to 6pm)    Interval History   Seen after paracentesis, no abdominal pain present.     Physical Exam   Temp: 98.6  F (37  C) Temp src: Oral BP: 120/63   Heart Rate: 71 Resp: 16 SpO2: 97 % O2 Device: Nasal cannula Oxygen Delivery: 2 LPM  Vitals:    01/27/19 2134   Weight: 63.5 kg (140 lb)     Vital Signs with Ranges  Temp:  [98  F (36.7  C)-99  F (37.2  C)] 98.6  F (37  C)  Heart Rate:  [66-79] 71  Resp:  [16] 16  BP: ()/(48-63) 120/63  SpO2:  [93 %-97 %] 97 %  I/O last 3 completed shifts:  In: 703 [I.V.:703]  Out: 500 [Urine:500]    # Pain Assessment:  Current Pain Score 1/29/2019   Patient currently in pain? denies   Pain score (0-10) -   Catina lagunas pain level was assessed and she currently denies pain.        Constitutional: Awake, alert, cooperative, no apparent distress  Respiratory: Clear to auscultation bilaterally, no crackles or wheezing  Cardiovascular: Regular rate and rhythm, normal S1 and S2, and no murmur noted  GI: Normal bowel sounds, soft, non-distended, non-tender.  Daughter reports prior bulging at umbilicus is now resolved.    Extrem: No calf tenderness, no ankle edema  Neuro: Ox3, no focal motor or sensory deficits    Medications       amLODIPine  5 mg Oral Daily     ARIPiprazole  5 mg Oral Daily     furosemide  40 mg Oral Daily     pantoprazole  40 mg Oral Daily     sertraline  100 mg Oral Daily     spironolactone  50 mg Oral Daily       Data   Recent Labs   Lab 01/28/19  0725 01/27/19  2240   WBC  --  6.5   HGB  --  10.8*   MCV  --  87   PLT  --  102*   INR  --  1.39*   NA  --  136   POTASSIUM  --  3.5   CHLORIDE  --  105   CO2  --  21   BUN  --  15   CR  --  0.72   ANIONGAP  --  10   KEVIN  --  8.7   GLC  --  96   ALBUMIN 2.8* 3.3*   PROTTOTAL  --  6.8   BILITOTAL  --  1.1   ALKPHOS  --  76   ALT  --  15   AST  --  16   LIPASE  --  91       Imaging:   Recent Results (from the past 24 hour(s))   US Paracentesis    Narrative    ULTRASOUND PARACENTESIS January 28, 2019 10:08 AM     HISTORY: High volume paracentesis with or without diagnostic fluid  analysis with labs to be drawn if ordered. Total paracentesis volume  as much as possible.    FINDINGS: Ultrasound was used to evaluate for the presence and best  approach for paracentesis. Written and oral informed consent was  obtained. A pause for the cause procedure to verify the correct  patient and correct procedure. The skin overlying the right lower  quadrant was prepped and draped in the usual sterile fashion. The  subcutaneous tissues were anesthetized with 8 mL 1% lidocaine. A  catheter was advanced into the peritoneal space and 4.45 L of  straw  colored fluid was drained. There were no immediate complications.  Ultrasound images were permanently stored.  Patient left the  ultrasound suite in satisfactory condition.      Impression    IMPRESSION: Technically successful paracentesis without immediate  complications.    KRYSTYNA BATISTA MD

## 2019-01-29 NOTE — PLAN OF CARE
Discharge Planner PT   Patient plan for discharge: Pt adamantly refusing TCU stating she would like to go home. Pt's spouse is hopeful pt can go to TCU as he has medical concerns himself and won't be able to provide 24/7 assist  Current status: Orders received, evaluation completed, and treatment initiated. Patient is a 79 y/o female admitted with abdominal pain with noted UTI, pt underwent paracentesis on 1/28/19. Pt lives with her spouse in a split level house with 4 stairs to enter/exit and 8 stairs to access living area. Pt independent at baseline without use of an AD. Pt sitting up in chair upon arrival of therapist, agreeable to session. Noted SpO2 on 1 L/min >90%. Attempted to wean O2, SpO2 on RA at rest: 94%. Pt performed sit <> stand and ambulation of 100' x 2 with FWW/no FWW and CGA, noted SpO2 decreased to 86% with activity. Pt navigated 3 stairs with 1 railing and CGA. Discussed benefits and PT recommendation of TCU with patient and patient's spouse.   Barriers to return to prior living situation: Decreased activity tolerance, new need for supplemental O2, Level of assist-pt's spouse is unable to provide 24/7 assist   Recommendations for discharge: TCU  Rationale for recommendations: Pt will benefit from continued skilled PT intervention in order to improve activity tolerance and independence with mobility. If pt continues to refuse TCU placement, pt will require 24 hour assist with all mobility with use of FWW and Home PT.        Entered by: Mary Ellen Gonzalez 01/29/2019 2:23 PM

## 2019-01-29 NOTE — PLAN OF CARE
Pt up with 1, denies pain.  O2 1 L attempting to wean.  Desai in place for retention. PT recommending TCU.

## 2019-01-29 NOTE — DISCHARGE SUMMARY
[facility]    Discharge Summary  Hospitalist    Date of Admission:  1/27/2019  Date of Discharge:  2/1/2019    Discharging Provider: Leon Gustafson MD    Discharge Diagnoses   Principal Problem:    Abdominal pain, related to Umbilical hernia and large amount of Ascites, related to underlying Cirrhosis of the Liver, etiology unknown     Active Problems:    Cirrhosis of the Liver with ascites -- progressive symptoms since 2016    Diastolic dysfunction    Esophageal varices with bleeding in diseases classified elsewhere (H)    Thrombocytopenia -- probably related to splenomegaly and portal hypertension     Possible UTI (urinary tract infection) -- suspect asymptomatic bacteria     Hypokalemia    Non-severe Malnutrition     Anemia -- probable chronic disease, and prior iron deficiency     History of Present Illness   78 year old female with Cirrhosis of the liver (first noted in 2016), hypertension, CVA, chronic diastolic dysfunction, hx of depression --  who presents for evaluation of abdominal pain.     Patient reports that she has been having generalized abdominal pain for the last 2 weeks.  She has a history of umbilical hernia that was diagnosed several weeks ago.  At that time it was noted to be nonobstructing.  Patient then went on a trip to [place] at which time during her trip she noticed that she had been having increased abdominal pain and urinary frequency along with right flank pain that she rates at a 8/10.  She has not been having any bloody stools, she has not had any nausea/vomiting.  She has no fevers/chills.  She has no blood in her stool.  She does have a history of ascites.  She did not endorse any chest pain/shortness of breath. She has no other complaints at this time. She has known ascites, and has had paracentesis the past per family, though they are unsure of whether cytology or fluid analysis has been done.  Her  endorse that she has lost some weight, and her  ascites has worsened over the last couple weeks. Initial UA with 29 WBC per high powered field.      Hospital Course   Treated with IV fluids and pain medications, seen by surgery who thought the umbilical hernia was aggravated by her ascites, and she did have paracentesis with 4 liters removed and abdominal pain resolved and the umbilical hernia was easily reducible.   Her ascites fluid showed some WBC's (didn't give a number) with 54 % lymphs, 17% mono, and 17% neutrophils, and albumin was 1.3 with total protein 2.1 -- transudate all consistent with ascites fluid and no sign of infection or malignancy, but cytology and culture still pending at discharge.     Will increase Spironolactone to 50 mg bid and stop lasix, with hopes of slowing the re accumulation of ascites fluid, and may need to increase the dose over time.  She was seen by surgery, Dr. Jay, who felt she felt she did not need surgery for the umbilical hernia.      Potassium dropped to 3.3, and was replaced, and 4.2 at discharge.     Given initial small number of WBC's in her urine she was started on Zosyn IV, and urine culture obtained which showed < 10K E. Coli, and patient did not have dysuria so antibiotics were discontinued.  Would check UA and UC in future if symptoms develop.  She did develop slight diarrhea, stool was negative for C. Diff Toxin, suspect the diarrhea may be antibiotic associated and self-limited.      Leon Gustafson MD  Pager: [phone]  Cell Phone:  [phone]       Significant Results and Procedures   As above    Pending Results   These results will be followed up by Dr. Gustafson  Unresulted Labs Ordered in the Past 30 Days of this Admission     Date and Time Order Name Status Description    1/28/2019 0955 Fluid Culture Aerobic Bacterial Preliminary           Code Status   Full Code       Primary Care Physician   Liang Glover    Physical Exam   Temp: 98.6  F (37  C) Temp src: Oral BP: 125/71   Heart Rate: 85  Resp: 16 SpO2: 94 % O2 Device: None (Room air)    Vitals:    01/30/19 0500 01/31/19 0700 02/01/19 0558   Weight: 62.1 kg (137 lb) 59.7 kg (131 lb 11.2 oz) 59.5 kg (131 lb 3.2 oz)     Vital Signs with Ranges  Temp:  [98.6  F (37  C)-98.7  F (37.1  C)] 98.6  F (37  C)  Heart Rate:  [70-88] 85  Resp:  [16] 16  BP: ()/(45-71) 125/71  SpO2:  [94 %] 94 %  I/O last 3 completed shifts:  In: 840 [P.O.:840]  Out: 400 [Urine:400]    Exam on discharge:   Abdomin soft and non-tender, small easily reducible umbilical hernia present which is non-tender.   No Ankle edema.      # Discharge Pain Plan:   - Patient currently has NO PAIN and is not being prescribed pain medications on discharge.      Discharge Disposition   Discharged to short-term care facility  Condition at discharge: Good    Consultations This Hospital Stay   SURGERY GENERAL IP CONSULT  SOCIAL WORK IP CONSULT  PHYSICAL THERAPY ADULT IP CONSULT  PHYSICAL THERAPY ADULT IP CONSULT    Time Spent on this Encounter   I spent a total of 35 minutes discharging this patient.     Discharge Orders      Reason for your hospital stay    Abdominal pain related to umbilical hernia and ascites.     Follow-up and recommended labs and tests     Follow up with Liang Glover in 1 week, check BMP then.  Record weight weekly and bring info to clinic appointments.     Activity    Your activity upon discharge: activity as tolerated     Discharge Instructions    Call Dr. Cordova at Pager [phone] if questions, or Cell Phone [phone].     General info for SNF    Length of Stay Estimate: Short Term Care: Estimated # of Days <30  Condition at Discharge: Improving  Level of care:skilled   Rehabilitation Potential: Good  Admission H&P remains valid and up-to-date: Yes  Recent Chemotherapy: N/A  Use Nursing Home Standing Orders: Yes     Mantoux instructions    Give two-step Mantoux (PPD) Per Facility Policy Yes     DNR/DNI     Diet    Follow this diet upon discharge: Orders Placed  This Encounter      Snacks/Supplements Adult: Boost Plus; Between Meals      Room Service      3 Gram Sodium Diet     Discharge Medications   Current Discharge Medication List      CONTINUE these medications which have CHANGED    Details   spironolactone (ALDACTONE) 50 MG tablet Take 1 tablet (50 mg) by mouth 2 times daily  Qty: 60 tablet, Refills: 3    Comments: Future refills by PCP Dr. Liang Glover with phone number [phone].  Associated Diagnoses: Other ascites         CONTINUE these medications which have NOT CHANGED    Details   AmLODIPine Besylate (NORVASC PO) Take 5 mg by mouth daily      ARIPiprazole (ABILIFY) 5 MG tablet Take 5 mg by mouth daily      ascorbic acid (VITAMIN C) 500 MG tablet Take 1 tablet (500 mg) by mouth daily  Qty: 30 tablet, Refills: 0    Associated Diagnoses: Iron deficiency anemia, unspecified iron deficiency      ferrous sulfate (IRON) 325 (65 FE) MG tablet Take 1 tablet (325 mg) by mouth 2 times daily (with meals)  Qty: 100 tablet, Refills: 0    Associated Diagnoses: Iron deficiency anemia, unspecified iron deficiency      pantoprazole (PROTONIX) 40 MG enteric coated tablet Take 1 tablet (40 mg) by mouth daily  Qty: 30 tablet, Refills: 0    Associated Diagnoses: Hematemesis, presence of nausea not specified      SERTRALINE HCL PO Take 100 mg by mouth daily          STOP taking these medications       furosemide (LASIX) 40 MG tablet Comments:   Reason for Stopping:             Allergies   Allergies   Allergen Reactions     Codeine      Data   Most Recent 3 CBC's:  Recent Labs   Lab Test 01/29/19  0637 01/27/19  2240 06/13/17  1231 06/13/17  0635   WBC 4.8 6.5  --  2.4*   HGB 10.6* 10.8* 9.1* 8.6*   MCV 88 87  --  96   PLT 74* 102*  --  58*      Most Recent 3 BMP's:  Recent Labs   Lab Test 01/30/19  0728 01/29/19  0637 01/27/19 2240 06/13/17  0635   NA  --  140 136 146*   POTASSIUM 4.2 3.3* 3.5 3.9   CHLORIDE  --  112* 105 118*   CO2  --  21 21 22   BUN  --  12 15 18   CR  --   0.69 0.72 0.72   ANIONGAP  --  7 10 6   KEVIN  --  8.1* 8.7 7.9*   GLC  --  89 96 97     Most Recent 2 LFT's:  Recent Labs   Lab Test 01/27/19  2240 06/12/17  0454   AST 16 22   ALT 15 24   ALKPHOS 76 68   BILITOTAL 1.1 0.6     Most Recent INR's and Anticoagulation Dosing History:  Anticoagulation Dose History     Recent Dosing and Labs Latest Ref Rng & Units 10/7/2016 6/12/2017 6/13/2017 1/27/2019    INR 0.86 - 1.14 1.28(H) 1.23(H) 1.39(H) 1.39(H)        Most Recent 3 Troponin's:  Recent Labs   Lab Test 10/07/16  1040 03/11/16 2010 03/11/16  1558   TROPI <0.015  The 99th percentile for upper reference range is 0.045 ug/L.  Troponin values in   the range of 0.045 - 0.120 ug/L may be associated with risks of adverse   clinical events.   <0.015  The 99th percentile for upper reference range is 0.045 ug/L.  Troponin values in   the range of 0.045 - 0.120 ug/L may be associated with risks of adverse   clinical events.   <0.015  The 99th percentile for upper reference range is 0.045 ug/L.  Troponin values in   the range of 0.045 - 0.120 ug/L may be associated with risks of adverse   clinical events.       Most Recent Cholesterol Panel:No lab results found.  Most Recent 6 Bacteria Isolates From Any Culture (See EPIC Reports for Culture Details):  Recent Labs   Lab Test 01/28/19  0955 01/28/19  0155   CULT Culture negative monitoring continues <10,000 colonies/mL  Escherichia coli  *  10,000 to 50,000 colonies/mL  Strain 2  Escherichia coli  *     Most Recent TSH, T4 and A1c Labs:  Recent Labs   Lab Test 03/11/16  1208   TSH 1.90

## 2019-01-29 NOTE — PROGRESS NOTES
"Care Coordinator met with patient and  in regards to discharge planning. Dr Cordova has been made aware patient is refusing a TCU by  this morning. He also has spoken to patient,  and daughter. When asked about an agency to use for Home Care, patient adamantly refused to have Home Care. \" I don't want anyone in our home. We have too much stuff being  for many years and having a lot of grandchildren.\"  commented, \"she refuses, TCU, Home Care, oxygen, and I don't feel like arguing with her. I can be with her sometimes, but not 24 hours a day. 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  "

## 2019-01-29 NOTE — PROGRESS NOTES
"SW:    D: SW spoke with pt and  this am. Pt is adamantly refusing TCU and told SW not to \"push it\" re: TCU placement.  states that he is actively passing a kidney stone and is not able to be with pt at home 24/7.  SW paged MD to update and seek advisement.     P: SW will continue to coordinate discharge as needed.    MYRIAM Zelaya  "

## 2019-01-29 NOTE — PLAN OF CARE
A&Ox3, disoriented to time. VSS on 2 liters NC. L/s clear. Denies pain. Abdomen distended. Occlusive dressing to paracentesis site-CDI. Tolerating reg diet. +BS. No loose stool overnight. Desai w/ adequate UOP. Up assist x1. SW following. Discharge to TCU pending progress. Nursing continue to monitor.

## 2019-01-29 NOTE — PLAN OF CARE
Pt disoriented to time but is easily oriented, very pleasant. Denies pain in her abdomen. Abd remains distended and band aid on (R) quadrant is CDI. Fair po intake. Up with one assist. Had one small watery stool and was incontinent as well. Desai intact.

## 2019-01-30 NOTE — PLAN OF CARE
Discharge Planner PT   Patient plan for discharge: Pt adamantly refusing TCU stating she would like to go home. Pt's spouse is hopeful pt can go to TCU as he has medical concerns himself and won't be able to provide 24/7 assist  Current status:  Pt performed bed mobility with min assist and sit to/from stand transfers with CGA.  Gait training 130 ft x 2 trials using handhold assist and min assist for balance.  Pt is unsteady during gait and had 1 LOB that required assist to stabilize. Pt refuses use of cane or walker despite being unsteady.   Barriers to return to prior living situation: Decreased activity tolerance, Level of assist-pt's spouse is unable to provide 24/7 assist   Recommendations for discharge: TCU per plan established by the PT.   Rationale for recommendations: Pt will benefit from continued skilled PT intervention in order to improve activity tolerance and independence with mobility. If pt continues to refuse TCU placement, pt will require 24 hour assist with all mobility with use of FWW and Home PT.            Entered by: Melani Smith 01/30/2019 12:41 PM

## 2019-01-30 NOTE — PROGRESS NOTES
[facility]    Hospitalist Progress Note    Assessment & Plan   Catina Barrow is a 78 year old female with cirrhosis and ascites who was admitted on 1/27/2019 with abdominal pain, resolved after paracentesis:    Impression:   Principal Problem:    Abdominal pain, generalized, related to ascites and umbilical hernia   -- better after paracentesis.     Active Problems:    Diastolic dysfunction    Esophageal varices with bleeding in diseases classified elsewhere (H)    Thrombocytopenia (H)    UTI (urinary tract infection)    Hypokalemia   -- replace potassium     Plan:  Anticipate TCU tomorrow.  Discussed with patient and .     DVT Prophylaxis: Pneumatic Compression Devices  Code Status: Full Code    Disposition: Expected discharge in 1 day to TCU    Leon Gustafson MD  Pager [phone]  Cell Phone [phone]  Text Page (7am to 6pm)    Interval History   Seen after paracentesis, no abdominal pain present.     Physical Exam   Temp: 98.4  F (36.9  C) Temp src: Oral BP: 115/62   Heart Rate: 81 Resp: 16 SpO2: 91 % O2 Device: None (Room air) Oxygen Delivery: 1 LPM  Vitals:    01/27/19 2134 01/29/19 0900   Weight: 63.5 kg (140 lb) 60.7 kg (133 lb 14.4 oz)     Vital Signs with Ranges  Temp:  [98.1  F (36.7  C)-98.6  F (37  C)] 98.4  F (36.9  C)  Heart Rate:  [66-84] 81  Resp:  [16] 16  BP: (102-125)/(41-73) 115/62  SpO2:  [86 %-97 %] 91 %  I/O last 3 completed shifts:  In: 60 [P.O.:60]  Out: 1250 [Urine:1250]    # Pain Assessment:  Current Pain Score 1/29/2019   Patient currently in pain? denies   Pain score (0-10) -   Catina lagunas pain level was assessed and she currently denies pain.        Constitutional: Awake, alert, cooperative, no apparent distress  Respiratory: Clear to auscultation bilaterally, no crackles or wheezing  Cardiovascular: Regular rate and rhythm, normal S1 and S2, and no murmur noted  GI: Normal bowel sounds, soft, non-distended, non-tender, small reducible umbilical  hernia  Extrem: No calf tenderness, no ankle edema  Neuro: Ox3, no focal motor or sensory deficits    Medications       amLODIPine  5 mg Oral Daily     ARIPiprazole  5 mg Oral Daily     pantoprazole  40 mg Oral Daily     sertraline  100 mg Oral Daily     spironolactone  50 mg Oral BID       Data   Recent Labs   Lab 01/29/19  0637 01/28/19  0725 01/27/19  2240   WBC 4.8  --  6.5   HGB 10.6*  --  10.8*   MCV 88  --  87   PLT 74*  --  102*   INR  --   --  1.39*     --  136   POTASSIUM 3.3*  --  3.5   CHLORIDE 112*  --  105   CO2 21  --  21   BUN 12  --  15   CR 0.69  --  0.72   ANIONGAP 7  --  10   KEVIN 8.1*  --  8.7   GLC 89  --  96   ALBUMIN  --  2.8* 3.3*   PROTTOTAL  --   --  6.8   BILITOTAL  --   --  1.1   ALKPHOS  --   --  76   ALT  --   --  15   AST  --   --  16   LIPASE  --   --  91       Imaging:   No results found for this or any previous visit (from the past 24 hour(s)).

## 2019-01-30 NOTE — PLAN OF CARE
A&Ox4. Disoriented to time at times. Forgetful. Ax1 gb. Tolerating diet. Incontinent. 1 small loose yellow BM. Denies pain. VSS on RA. Sats in mid 90's. Paracentesis site CDI. Desai patent with AUO. Discharge pending.

## 2019-01-30 NOTE — PROVIDER NOTIFICATION
Brief update:    Paged regarding loose stools.  Question if samples should be sent for C. difficile    Patient has had 3 loose stools since 8 PM    No leukocytosis, was admitted with abdominal pain, though this has since resolved since paracentesis.    As patient is afebrile, no abdominal pain, no leukocytosis, hold on sending for C. Difficile.    Bret Quinonez MD  1:18 AM

## 2019-01-30 NOTE — PROGRESS NOTES
ODALYS  D: Received update that pt is in agreeable to TCU and would like referral sent to [facility] in [place]. ODALYS sent out referral via DOD. ODALYS will wait to hear back from [facility] regarding bed availability.   P: Will continue to follow and support a safe discharge plan    Shital WHITE, LG     ADDENDUM @0566: Called and left VM for [facility] liaison to check on referral status and bed availability.

## 2019-01-30 NOTE — PLAN OF CARE
A&O for me, forgetful. Pt up with 1, denies pain.  Lloyd stewart @ 1315 - pt is DTV.  Plan to discharge to TCU when bed available.

## 2019-01-30 NOTE — PROVIDER NOTIFICATION
MD Notification    Notified Person: MD    Notified Person Name: Dr Quinonez    Notification Date/Time: 1/30/19 @0110    Notification Interaction: phone paged    Purpose of Notification: small loose stools x3 since 2000. C. Diff r/o? Enteric isolation?     Orders Received: No need to r/o C. Diff or enteric iso at this time. Continue to monitior.     Comments: FYI: No laxatives since admission, last (Zosyn) abx on 1/28/19  per eMAR.

## 2019-01-30 NOTE — PLAN OF CARE
A&Ox3. Disoriented to time. VSS on RA. O2 sating at 93-94% on RA. Denies SOB. L/s clear. Abdomen remains distended. Paracentesis site CDI. Denies pain. Tolerating 3 gr NA diet. Desai patent/intact. Incontinent of yellow loose stool, Hospitalitis notified-no new orders. Recheck K+ level this AM. Possible discharge to home vs TCU today.

## 2019-01-30 NOTE — PROGRESS NOTES
01/29/19 1340   Quick Adds   Type of Visit Initial PT Evaluation   Living Environment   Lives With spouse   Living Arrangements house   Living Environment Comment 4 stairs to enter/exit, 8 stairs to access main living area   Self-Care   Usual Activity Tolerance good   Current Activity Tolerance fair   Equipment Currently Used at Home none   Activity/Exercise/Self-Care Comment Pt owns SEC.    Functional Level Prior   Ambulation 0-->independent   Transferring 0-->independent   Fall history within last six months no   General Information   Onset of Illness/Injury or Date of Surgery - Date 01/27/19   Referring Physician Leon Cordova MD   Patient/Family Goals Statement Return home.   Pertinent History of Current Problem (include personal factors and/or comorbidities that impact the POC) 79 y/o female admitted with abdominal pain and noted to have UTI. Pt underwent thoracentesis on 1/28.    Precautions/Limitations fall precautions   General Observations Pt sitting up in chair upon arrival of therapist.    General Info Comments Up with assist.    Cognitive Status Examination   Orientation orientation to person, place and time   Level of Consciousness alert   Follows Commands and Answers Questions 75% of the time   Personal Safety and Judgment at risk behaviors demonstrated   Pain Assessment   Patient Currently in Pain No   Integumentary/Edema   Integumentary/Edema Comments No deficits noted. Thoracentesis site not observed.    Posture    Posture Comments Noted forward head and shoulder posture upon sitting and standing with FWW.    Range of Motion (ROM)   ROM Comment B LEs WFL.    Strength   Strength Comments Not formally assessed. Pt demonstrates at least 3/5 grossly in B LEs with functional mobility.   Bed Mobility   Bed Mobility Comments NT. Pt sitting up in chair.    Transfer Skills   Transfer Comments Sit <> stand with FWW and CGA.    Gait   Gait Comments Pt amb 10' with FWW and CGA.    Balance  "  Balance Comments Noted good sitting and standing balance at FWW.    Sensory Examination   Sensory Perception Comments Pt denies numbness/tingling in B LEs and UEs.    General Therapy Interventions   Planned Therapy Interventions balance training;bed mobility training;gait training;ROM;strengthening;transfer training   Clinical Impression   Criteria for Skilled Therapeutic Intervention yes, treatment indicated   PT Diagnosis Decreased activity tolerance with functional mobility.    Influenced by the following impairments Decreased activity tolerance, SOB, Generalized weakness, Impaired balance   Functional limitations due to impairments Limited functional mobility requiring AD and assist.    Clinical Presentation Stable/Uncomplicated   Clinical Presentation Rationale Based on PMH, current presentation, and social support.    Clinical Decision Making (Complexity) Low complexity   Therapy Frequency` daily   Predicted Duration of Therapy Intervention (days/wks) 3 days   Anticipated Discharge Disposition Transitional Care Facility   Risk & Benefits of therapy have been explained Yes   Patient, Family & other staff in agreement with plan of care Yes   [facility] TM \"6 Clicks\"   2016, Trustees of [facility], under license to [employer] ([employer]).  All rights reserved.   6 Clicks Short Forms Basic Mobility Inpatient Short Form   [facility]  \"6 Clicks\" V.2 Basic Mobility Inpatient Short Form   1. Turning from your back to your side while in a flat bed without using bedrails? 4 - None   2. Moving from lying on your back to sitting on the side of a flat bed without using bedrails? 4 - None   3. Moving to and from a bed to a chair (including a wheelchair)? 3 - A Little   4. Standing up from a chair using your arms (e.g., wheelchair, or bedside chair)? 3 - A Little   5. To walk in hospital room? 3 - A Little   6. Climbing 3-5 steps with a railing? 3 - A Little   Basic Mobility Raw Score (Score out of " 24.Lower scores equate to lower levels of function) 20   Total Evaluation Time   Total Evaluation Time (Minutes) 8

## 2019-01-31 NOTE — PLAN OF CARE
PT:  Discharge Planner PT   Patient plan for discharge: TCU  Current status: Pt required min A for supine to sit, min A for gait of 350 ft with no AD and unsteady balance. Pt generally unsteady throughout gait but refuses to use a cane or walker. Ambs slowly.  Barriers to return to prior living situation: Falls risk, needs assist with mobility, spouse unable to provide 24 hr assist.  Recommendations for discharge: TCU  Rationale for recommendations: Pt would benefit from continued PT to improve strength, balance, mobility to increase independence, reduce falls risk and increase safety before returning home.       Entered by: Jeanine Hopkins 01/31/2019 3:44 PM

## 2019-01-31 NOTE — PROGRESS NOTES
[facility]    Hospitalist Progress Note    Assessment & Plan   Catina Barrow is a 78 year old female with cirrhosis and ascites who was admitted on 1/27/2019 with abdominal pain, resolved after paracentesis:    Impression:   Principal Problem:    Abdominal pain, generalized, related to ascites and umbilical hernia   -- better after paracentesis.     Active Problems:    Diastolic dysfunction    Esophageal varices with bleeding in diseases classified elsewhere (H)    Thrombocytopenia (H)    UTI (urinary tract infection)    Hypokalemia   -- replace potassium     Plan:  Anticipate TCU tomorrow.  Discussed with patient and .     DVT Prophylaxis: Pneumatic Compression Devices  Code Status: DNR/DNI    Disposition: Expected discharge in 1 day to TCU    Leon Gustafson MD  Pager [phone]  Cell Phone [phone]  Text Page (7am to 6pm)    Interval History   Seen after paracentesis, no abdominal pain present.     Physical Exam   Temp: 98.6  F (37  C) Temp src: Oral BP: 123/70 Pulse: 83 Heart Rate: 81 Resp: 16 SpO2: 94 % O2 Device: None (Room air)    Vitals:    01/29/19 0900 01/30/19 0500 01/31/19 0700   Weight: 60.7 kg (133 lb 14.4 oz) 62.1 kg (137 lb) 59.7 kg (131 lb 11.2 oz)     Vital Signs with Ranges  Temp:  [98  F (36.7  C)-99.3  F (37.4  C)] 98.6  F (37  C)  Pulse:  [83] 83  Heart Rate:  [72-92] 81  Resp:  [16-18] 16  BP: (121-131)/(68-75) 123/70  SpO2:  [92 %-96 %] 94 %  I/O last 3 completed shifts:  In: 220 [P.O.:220]  Out: -     # Pain Assessment:  Current Pain Score 1/31/2019   Patient currently in pain? denies   Pain score (0-10) -   Catina lagunas pain level was assessed and she currently denies pain.        Constitutional: Awake, alert, cooperative, no apparent distress  Respiratory: Clear to auscultation bilaterally, no crackles or wheezing  Cardiovascular: Regular rate and rhythm, normal S1 and S2, and no murmur noted  GI: Normal bowel sounds, soft, non-distended, non-tender,  small reducible umbilical hernia  Extrem: No calf tenderness, no ankle edema  Neuro: Ox3, no focal motor or sensory deficits    Medications       amLODIPine  5 mg Oral Daily     ARIPiprazole  5 mg Oral Daily     pantoprazole  40 mg Oral Daily     sertraline  100 mg Oral Daily     spironolactone  50 mg Oral BID       Data   Recent Labs   Lab 01/30/19  0728 01/29/19  0637 01/28/19  0725 01/27/19  2240   WBC  --  4.8  --  6.5   HGB  --  10.6*  --  10.8*   MCV  --  88  --  87   PLT  --  74*  --  102*   INR  --   --   --  1.39*   NA  --  140  --  136   POTASSIUM 4.2 3.3*  --  3.5   CHLORIDE  --  112*  --  105   CO2  --  21  --  21   BUN  --  12  --  15   CR  --  0.69  --  0.72   ANIONGAP  --  7  --  10   KEVIN  --  8.1*  --  8.7   GLC  --  89  --  96   ALBUMIN  --   --  2.8* 3.3*   PROTTOTAL  --   --   --  6.8   BILITOTAL  --   --   --  1.1   ALKPHOS  --   --   --  76   ALT  --   --   --  15   AST  --   --   --  16   LIPASE  --   --   --  91       Imaging:   No results found for this or any previous visit (from the past 24 hour(s)).

## 2019-01-31 NOTE — PLAN OF CARE
Patient A&O x3 ( not to the time), forgetful, Denies chest pain/tightness/pressure and VSS,  Lungs are diminished and on RA, up with assist of 1-2 , on 3 g Na diet, skin is fragile and bruised  and pt turned and repositioned every 2 hrs, voiding - incontinent of urine and stool and last BM 1/30/2019. IV is in  R arm and SL. Plan for TCU 1/31

## 2019-01-31 NOTE — PROGRESS NOTES
ODALYS  D: Received call from [facility] regarding TCU bed availability at [facility]. [facility]. Called and left VM for pt's spouse, Eusebio to provide update and to see about transportation.   P: Will continue to follow and support a safe discharge plan    Shital WHITE, LGSW

## 2019-01-31 NOTE — PLAN OF CARE
Pt is A&O x 2, VSS on RA. Repositioned per pt's comfort. Incontinent of bowel and bladder. Loose BM x1 this shift. Denies chest pain or SOB. Will continue to monitor.

## 2019-02-01 NOTE — PROVIDER NOTIFICATION
MD Notification    Notified Person: MD    Notified Person Name: Dr. Cordova    Notification Date/Time: 2/1/19 0820    Notification Interaction: paged MD    Purpose of Notification: 3rd loose stool today, need to send C diff sample, requesting order    Orders Received:    Comments:

## 2019-02-01 NOTE — PLAN OF CARE
VSS. RA. Very forgetful. Denies pain. Loose BM x4, sometimes incontinent. Up Ax1 GB, remains unsteady. Tolerating diet. R paracentesis site CDI. Awaiting stool sample results and PT eval, possible discharge today.     3315-5120: C diff negative. [facility] could not take so late in the day so pt will discharge there tomorrow. Bladder scanned for 825cc, straight cathed only 350cc. 1 more BM. No other changes.

## 2019-02-01 NOTE — PROGRESS NOTES
[facility]    Hospitalist Progress Note    Assessment & Plan   Catina Barrow is a 78 year old female with cirrhosis and ascites who was admitted on 1/27/2019 with abdominal pain, resolved after paracentesis:    Impression:   Principal Problem:    Abdominal pain, generalized, related to ascites and umbilical hernia   -- better after paracentesis.     Active Problems:    Diastolic dysfunction    Esophageal varices with bleeding in diseases classified elsewhere (H)    Thrombocytopenia (H)    UTI (urinary tract infection)    Hypokalemia   -- replace potassium     Plan:  Anticipate TCU tomorrow.  Discussed with patient and .     DVT Prophylaxis: Pneumatic Compression Devices  Code Status: DNR/DNI    Disposition: Expected discharge in 1 day to TCU    Leon Gustafson MD  Pager [phone]  Cell Phone [phone]  Text Page (7am to 6pm)    Interval History   No abdominal pain, had 3 loose stools today otherwise feels fine.      Physical Exam   Temp: 98.6  F (37  C) Temp src: Oral BP: 125/71   Heart Rate: 85 Resp: 16 SpO2: 94 % O2 Device: None (Room air)    Vitals:    01/30/19 0500 01/31/19 0700 02/01/19 0558   Weight: 62.1 kg (137 lb) 59.7 kg (131 lb 11.2 oz) 59.5 kg (131 lb 3.2 oz)     Vital Signs with Ranges  Temp:  [98.6  F (37  C)-98.7  F (37.1  C)] 98.6  F (37  C)  Heart Rate:  [70-88] 85  Resp:  [16] 16  BP: ()/(45-71) 125/71  SpO2:  [94 %] 94 %  I/O last 3 completed shifts:  In: 840 [P.O.:840]  Out: 400 [Urine:400]    # Pain Assessment:  Current Pain Score 2/1/2019   Patient currently in pain? denies   Pain score (0-10) -   Catina lagunas pain level was assessed and she currently denies pain.        Constitutional: Awake, alert, cooperative, no apparent distress  Respiratory: Clear to auscultation bilaterally, no crackles or wheezing  Cardiovascular: Regular rate and rhythm, normal S1 and S2, and no murmur noted  GI: Normal bowel sounds, soft, non-distended, non-tender, small  reducible umbilical hernia  Extrem: No calf tenderness, no ankle edema  Neuro: Ox3, no focal motor or sensory deficits    Medications       amLODIPine  5 mg Oral Daily     ARIPiprazole  5 mg Oral Daily     pantoprazole  40 mg Oral Daily     sertraline  100 mg Oral Daily     spironolactone  50 mg Oral BID       Data   Recent Labs   Lab 01/30/19  0728 01/29/19  0637 01/28/19  0725 01/27/19  2240   WBC  --  4.8  --  6.5   HGB  --  10.6*  --  10.8*   MCV  --  88  --  87   PLT  --  74*  --  102*   INR  --   --   --  1.39*   NA  --  140  --  136   POTASSIUM 4.2 3.3*  --  3.5   CHLORIDE  --  112*  --  105   CO2  --  21  --  21   BUN  --  12  --  15   CR  --  0.69  --  0.72   ANIONGAP  --  7  --  10   KEVIN  --  8.1*  --  8.7   GLC  --  89  --  96   ALBUMIN  --   --  2.8* 3.3*   PROTTOTAL  --   --   --  6.8   BILITOTAL  --   --   --  1.1   ALKPHOS  --   --   --  76   ALT  --   --   --  15   AST  --   --   --  16   LIPASE  --   --   --  91       Imaging:   No results found for this or any previous visit (from the past 24 hour(s)).

## 2019-02-01 NOTE — PROGRESS NOTES
ODALYS  D: Received call from [facility] that [facility] would need to be at [facility] by 1300. ODALYS explained pt's  is transporting and will get her by 1200. ODALYS will wait to hear from MD if pt is cleared for discharge.  P: Will continue to follow and support a safe discharge plan    Shital WHITE, [facility]    ADDENDUM @8556: Received update from RN/MD that pt could be discharged today. Called [facility] to see if pt could leave in the next hour to go to [facility]. [facility] stated it was too late in the day for admission but they could accept pt for admission tomorrow.

## 2019-02-01 NOTE — PLAN OF CARE
Patient A&O, VSS on RA. Up with the assist of one. Denies pain. Abdominal round, non tender. Incontinent of bowel & bladder. Retaining urine. Bladder scanned at 0645 for 400 mL. One loose stool overnight. Patient slept between cares. Plan to discharge to TCU today. Will continue to monitor.

## 2019-02-01 NOTE — PLAN OF CARE
PT:  Discharge Planner PT   Patient plan for discharge: TCU  Current status: Pt amb 300' CGA/min-A without AD. Very slow pace, occasional mistep with minor LOB. Limited RLE foot clearance. Discussed TCU recommendation. Family has been wanting TCU for days, pt states she also wants to go to TCU because she feels too weak.  Barriers to return to prior living situation: Falls risk, needs assist with mobility, spouse unable to provide 24 hr assist.  Recommendations for discharge: TCU  Rationale for recommendations: Pt would benefit from continued PT to improve strength, balance, mobility to increase independence, reduce falls risk and increase safety before returning home.       Entered by: Pawan Dupree 02/01/2019 2:40 PM

## 2019-02-01 NOTE — PLAN OF CARE
Pt A&Ox3-4, VSS, up with assist of one ambulate in hallway, denies pain, abd rounded, nontender, had 3 loose stools this am, tolerating regular diet, appetite improving, incontinent of B&B, IV SL. Pt to discharge to TCU tomorrow.

## 2019-02-02 NOTE — PLAN OF CARE
Pt is A & O, forgetful intermittently. VSS on RA, afebrile, denies pain. Up with assist of 1. Incontinent of B&B, brief in place. CMS intact and paracentesis site C/D/I. Slept well all night. Probable discharge today.

## 2019-02-02 NOTE — PROGRESS NOTES
Discharge instructions given to pt and pt's .  Belongings packed.  Denies pain at this time.  Up w/ SBA, steady on feet.  No further questions at this time.  Discharged to [facility] via wheelchair, transport by pt's .

## 2019-02-02 NOTE — PROGRESS NOTES
[facility]    Hospitalist Progress Note    Date of Service (when I saw the patient): 02/02/2019    Assessment & Plan   Catina Barrow is a 78 year old female who was admitted on 1/27/2019.    Catina Barrow is a 78 year old female with cirrhosis and ascites who was admitted on 1/27/2019 with abdominal pain, resolved after paracentesis:     Impression:   Principal Problem:    Abdominal pain, generalized, related to ascites and umbilical hernia              -- better after paracentesis.      Active Problems:    Diastolic dysfunction    Esophageal varices with bleeding in diseases classified elsewhere (H)    Thrombocytopenia (H)    UTI (urinary tract infection)    Hypokalemia              -- replace potassium      Plan:  TCU today    DVT Prophylaxis: Pneumatic Compression Devices  Code Status: DNR/DNI     Disposition TCu today     Divine Martinez MD  [phone] (P)      Interval History   Doing well. No new issues     -Data reviewed today: I reviewed all new labs and imaging results over the last 24 hours. I personally reviewed no images or EKG's today.    Physical Exam   Temp: 97.7  F (36.5  C) Temp src: Oral BP: 113/67 Pulse: 75 Heart Rate: 79 Resp: 16 SpO2: 95 % O2 Device: None (Room air)    Vitals:    01/31/19 0700 02/01/19 0558 02/02/19 0554   Weight: 59.7 kg (131 lb 11.2 oz) 59.5 kg (131 lb 3.2 oz) 57.3 kg (126 lb 4.8 oz)     Vital Signs with Ranges  Temp:  [97.7  F (36.5  C)-99  F (37.2  C)] 97.7  F (36.5  C)  Pulse:  [75-80] 75  Heart Rate:  [79-85] 79  Resp:  [14-16] 16  BP: (113-121)/(64-67) 113/67  SpO2:  [95 %-96 %] 95 %  I/O last 3 completed shifts:  In: 360 [P.O.:360]  Out: 350 [Urine:350]    Constitutional: Awake, alert, cooperative, no apparent distress  Respiratory: Clear to auscultation bilaterally, no crackles or wheezing  Cardiovascular: Regular rate and rhythm, normal S1 and S2, and no murmur noted  GI: Normal bowel sounds, soft, non-distended, non-tender  Skin/Integumen: No  rashes, no cyanosis, no edema  Other:     Medications       amLODIPine  5 mg Oral Daily     ARIPiprazole  5 mg Oral Daily     pantoprazole  40 mg Oral Daily     sertraline  100 mg Oral Daily     spironolactone  50 mg Oral BID       Data   Recent Labs   Lab 01/30/19  0728 01/29/19  0637 01/28/19  0725 01/27/19  2240   WBC  --  4.8  --  6.5   HGB  --  10.6*  --  10.8*   MCV  --  88  --  87   PLT  --  74*  --  102*   INR  --   --   --  1.39*   NA  --  140  --  136   POTASSIUM 4.2 3.3*  --  3.5   CHLORIDE  --  112*  --  105   CO2  --  21  --  21   BUN  --  12  --  15   CR  --  0.69  --  0.72   ANIONGAP  --  7  --  10   KEVIN  --  8.1*  --  8.7   GLC  --  89  --  96   ALBUMIN  --   --  2.8* 3.3*   PROTTOTAL  --   --   --  6.8   BILITOTAL  --   --   --  1.1   ALKPHOS  --   --   --  76   ALT  --   --   --  15   AST  --   --   --  16   LIPASE  --   --   --  91       No results found for this or any previous visit (from the past 24 hour(s)).

## 2019-02-02 NOTE — DISCHARGE SUMMARY
Addendum to discharge summary done by    Date of discharge 2/2/19  Pt stayed in the hospital another 24hours to be  placed in TCU. She remained stable in those 24hours. No new issues     Divine Martinez MD

## 2019-02-02 NOTE — PROGRESS NOTES
SW:    D: SW following for discharge needs. Received discharge orders. [facility] to [facility]. Patient's  plans to transport.     PAS-RR    D: Per DHS regulation, SW completed and submitted PAS-RR to [place] [employer] Direct Connect via the Senior [employer].  PAS-RR confirmation # is : [id number]    I: SW spoke with and they are aware a PAS-RR has been submitted.  SW reviewed with  that they may be contacted for a follow up appointment within 10 days of hospital discharge if their SNF stay is < 30 days.  Contact information for [employer] was also provided.    A: verbalized understanding.    P: Further questions may be directed to [employer] at #[phone], option #4 for PAS-RR staff.

## 2020-07-30 NOTE — PROCEDURE: EXCISION
----- Message from Donya Small sent at 7/30/2020 12:07 PM CDT -----  Type:  Patient Returning Call    Who Called:pt  Who Left Message for Patient:nurse  Does the patient know what this is regarding?:her xanax  Would the patient rather a call back or a response via [facility]? Call back   Best Call Back Number:[phone]  Additional Information: .    Thank you         Double Island Pedicle Flap Text: The defect edges were debeveled with a #15 scalpel blade.  Given the location of the defect, shape of the defect and the proximity to free margins a double island pedicle advancement flap was deemed most appropriate.  Using a sterile surgical marker, an appropriate advancement flap was drawn incorporating the defect, outlining the appropriate donor tissue and placing the expected incisions within the relaxed skin tension lines where possible.    The area thus outlined was incised deep to adipose tissue with a #15 scalpel blade.  The skin margins were undermined to an appropriate distance in all directions around the primary defect and laterally outward around the island pedicle utilizing iris scissors.  There was minimal undermining beneath the pedicle flap.

## 2021-10-15 NOTE — CONSULTS
"CLINICAL NUTRITION SERVICES  -  ASSESSMENT NOTE      Future/Additional Recommendations:   Medical Food Supplement - will send appropriate supplements when diet advances     Malnutrition (1/28):   % Weight Loss:  None noted - may be masked by fluid  % Intake:  Decreased intake does not meet criteria for malnutrition   Subcutaneous Fat Loss:  Upper arm region moderate depletion  Muscle Loss:  Temporal and Clavicle bone region moderate depletion  Fluid Retention:  Ascites - not nutrition-related    Malnutrition Diagnosis: Non-Severe malnutrition  In Context of:  Chronic illness or disease         REASON FOR ASSESSMENT  Catina Barrow is a 78 year old female seen by Registered Dietitian for Admission Nutrition Risk Screen -  Reduced oral intake over the last month      NUTRITION HISTORY  - Information obtained from the pt and her daughter.  Pt follows a low salt diet and had per eating per her usual until the last 1-2 days when she had abdominal pain and wasn't feeling well.  Pt and her  had been in [place] for the past 2 weeks but cut their vacation short because she has been feeling ill. They just flew home yesterday, 1/27.Once they got home, their daughter \"assessed the situation\" and brought pt to ER last night.      CURRENT NUTRITION ORDERS  Diet Order:     NPO   Pt had a paracentesis this morning, she had 4,450 mL removed.      PHYSICAL FINDINGS  Observed  Muscle Wasting   Subcutaneous fat loss   Obtained from Chart/Interdisciplinary Team  Hx of ascites, paracentesis    ANTHROPOMETRICS  Height: 5' 7\"  Weight: 140 lbs 0 oz (63.5 kg) - reported weight  Body mass index is 21.93 kg/m .  Weight Status:  Normal BMI  IBW: 61.4 kg +/- 10%  % IBW: 103%  Weight History: Pt states her UBW is 140#. However, her daughter thinks she may have lost wt, she's noticed pt looks thinner in her arms and upper body, She also says the pt has seemed a little slower, a little weaker then normal.  Wt Readings from Last 10 Encounters: "   01/27/19 63.5 kg (140 lb)   06/13/17 70.8 kg (156 lb)   10/07/16 61.2 kg (135 lb)   04/18/16 70.8 kg (156 lb)   04/07/16 70.8 kg (156 lb)   03/11/16 69 kg (152 lb 3.2 oz)         LABS  Labs reviewed    MEDICATIONS  Medications reviewed      ASSESSED NUTRITION NEEDS PER APPROVED PRACTICE GUIDELINES:  Dosing Weight 61.4 kg - IBW  Estimated Energy Needs: 0919-9603 kcals (30-35 Kcal/Kg)  Justification: repletion  Estimated Protein Needs:  grams protein (1.5-1.8 g pro/Kg)  Justification: Repletion      MALNUTRITION:  % Weight Loss:  None noted - may be masked by fluid  % Intake:  Decreased intake does not meet criteria for malnutrition   Subcutaneous Fat Loss:  Upper arm region moderate depletion  Muscle Loss:  Temporal and Clavicle bone region moderate depletion  Fluid Retention:  Ascites - not nutrition-related    Malnutrition Diagnosis: Non-Severe malnutrition  In Context of:  Chronic illness or disease    NUTRITION DIAGNOSIS:  Predicted suboptimal nutrient intake related to abdominal pain, current NPO status      NUTRITION INTERVENTIONS  Recommendations / Nutrition Prescription  ADAT per MD.      Implementation  Nutrition education: Provided education on nutritional supplements  Medical Food Supplement - will send appropriate supplements when diet advances      Nutrition Goals  Pt will advance diet to solids in 2-3 days.      MONITORING AND EVALUATION:  Progress towards goals will be monitored and evaluated per protocol and Practice Guidelines      Codi Griggs RD  Pager [phone] (M-F)            [phone] (W/E & Hol)                   no

## 2021-11-04 NOTE — HPI: SKIN LESION
How Severe Is Your Skin Lesion?: moderate
Has Your Skin Lesion Been Treated?: not been treated
Is This A New Presentation, Or A Follow-Up?: Skin Lesions
Additional History: Dolly was in [place] about 2 months ago when she noticed these lesions.
Erivedge Counseling- I discussed with the patient the risks of Erivedge including but not limited to nausea, vomiting, diarrhea, constipation, weight loss, changes in the sense of taste, decreased appetite, muscle spasms, and hair loss.  The patient verbalized understanding of the proper use and possible adverse effects of Erivedge.  All of the patient's questions and concerns were addressed.

## 2022-03-09 NOTE — ED NOTES
Patient was walked to the bathroom with assistance of EDT. Per EDT patient was unsteady and had a large amount of loose stool in her depends when in the restroom. Patient was unable to urinate and therefore was straight cathed for a urine sample. Patient bladder was completely drained and patient voided approximately 550 mL of selin urine. MD notified    Yes

## 2025-03-25 NOTE — PROCEDURE: COUNSELING
Hpi Title: Evaluation of Skin Lesions
Additional History: Check spot to left arm that is not healing, left cheek and a spot to nasal dorsum.
Detail Level: Detailed